# Patient Record
Sex: FEMALE | Race: WHITE | NOT HISPANIC OR LATINO | Employment: FULL TIME | URBAN - METROPOLITAN AREA
[De-identification: names, ages, dates, MRNs, and addresses within clinical notes are randomized per-mention and may not be internally consistent; named-entity substitution may affect disease eponyms.]

---

## 2024-03-26 ENCOUNTER — OFFICE VISIT (OUTPATIENT)
Dept: FAMILY MEDICINE CLINIC | Facility: CLINIC | Age: 29
End: 2024-03-26
Payer: COMMERCIAL

## 2024-03-26 ENCOUNTER — LAB (OUTPATIENT)
Dept: LAB | Facility: HOSPITAL | Age: 29
End: 2024-03-26
Payer: COMMERCIAL

## 2024-03-26 VITALS
HEIGHT: 64 IN | HEART RATE: 77 BPM | DIASTOLIC BLOOD PRESSURE: 70 MMHG | BODY MASS INDEX: 32.1 KG/M2 | OXYGEN SATURATION: 98 % | SYSTOLIC BLOOD PRESSURE: 110 MMHG | TEMPERATURE: 98.7 F | WEIGHT: 188 LBS

## 2024-03-26 DIAGNOSIS — Z00.00 ENCOUNTER FOR MEDICAL EXAMINATION TO ESTABLISH CARE: ICD-10-CM

## 2024-03-26 DIAGNOSIS — K62.89 RECTAL PAIN: ICD-10-CM

## 2024-03-26 DIAGNOSIS — N94.6 DYSMENORRHEA: ICD-10-CM

## 2024-03-26 DIAGNOSIS — Z01.419 ROUTINE GYNECOLOGICAL EXAMINATION: ICD-10-CM

## 2024-03-26 DIAGNOSIS — J45.20 MILD INTERMITTENT ASTHMA WITHOUT COMPLICATION: ICD-10-CM

## 2024-03-26 DIAGNOSIS — K92.1 HEMATOCHEZIA: ICD-10-CM

## 2024-03-26 DIAGNOSIS — K64.9 BLEEDING HEMORRHOIDS: Primary | ICD-10-CM

## 2024-03-26 LAB
DEPRECATED RDW RBC AUTO: 38.7 FL (ref 37–54)
ERYTHROCYTE [DISTWIDTH] IN BLOOD BY AUTOMATED COUNT: 12.7 % (ref 12.3–15.4)
HCT VFR BLD AUTO: 42.7 % (ref 34–46.6)
HGB BLD-MCNC: 14.1 G/DL (ref 12–15.9)
IRON 24H UR-MRATE: 114 MCG/DL (ref 37–145)
IRON SATN MFR SERPL: 29 % (ref 20–50)
MCH RBC QN AUTO: 28.1 PG (ref 26.6–33)
MCHC RBC AUTO-ENTMCNC: 33 G/DL (ref 31.5–35.7)
MCV RBC AUTO: 85.2 FL (ref 79–97)
PLATELET # BLD AUTO: 307 10*3/MM3 (ref 140–450)
PMV BLD AUTO: 8.8 FL (ref 6–12)
RBC # BLD AUTO: 5.01 10*6/MM3 (ref 3.77–5.28)
TIBC SERPL-MCNC: 387 MCG/DL (ref 298–536)
TRANSFERRIN SERPL-MCNC: 260 MG/DL (ref 200–360)
WBC NRBC COR # BLD AUTO: 8.01 10*3/MM3 (ref 3.4–10.8)

## 2024-03-26 PROCEDURE — 84466 ASSAY OF TRANSFERRIN: CPT | Performed by: STUDENT IN AN ORGANIZED HEALTH CARE EDUCATION/TRAINING PROGRAM

## 2024-03-26 PROCEDURE — 83540 ASSAY OF IRON: CPT | Performed by: STUDENT IN AN ORGANIZED HEALTH CARE EDUCATION/TRAINING PROGRAM

## 2024-03-26 PROCEDURE — 85027 COMPLETE CBC AUTOMATED: CPT | Performed by: STUDENT IN AN ORGANIZED HEALTH CARE EDUCATION/TRAINING PROGRAM

## 2024-03-26 RX ORDER — ALBUTEROL SULFATE 90 UG/1
2 AEROSOL, METERED RESPIRATORY (INHALATION) EVERY 6 HOURS PRN
Qty: 18 G | Refills: 2 | Status: SHIPPED | OUTPATIENT
Start: 2024-03-26

## 2024-03-26 RX ORDER — HYDROCORTISONE ACETATE 25 MG/1
SUPPOSITORY RECTAL
COMMUNITY
Start: 2024-03-22

## 2024-03-26 RX ORDER — ALBUTEROL SULFATE 90 UG/1
2 AEROSOL, METERED RESPIRATORY (INHALATION) EVERY 6 HOURS PRN
COMMUNITY
End: 2024-03-26 | Stop reason: SDUPTHER

## 2024-03-26 RX ORDER — HYDROCORTISONE 25 MG/G
CREAM TOPICAL
COMMUNITY
Start: 2024-03-22

## 2024-03-26 RX ORDER — NAPROXEN 500 MG/1
TABLET ORAL
COMMUNITY
Start: 2023-12-19

## 2024-03-26 NOTE — PROGRESS NOTES
New Patient Office Visit      Patient Name: ELIDIA Sanderson  : 1995   MRN: [id number]   Care Team: Patient Care Team:  Indigo Mitchell DO as PCP - General (Internal Medicine)    Chief Complaint:    Chief Complaint   Patient presents with    GI Problem     Rectal pain    Establish Care       History of Present Illness: ELIDIA Sanderson is a 28 y.o. female who is here today to establish care. She is the  for [facility] service organization.      Previous gynecologist has concerns for endometriosis, she has not had diagnostic biopsy. She struggles with chronic pelvic pain coming and going, typically prior to menstrual cycles. Can be debilitating at times and causes her to miss work. She has previously tried OCPs and did not feel this helped symptoms. She also felt this negatively impacted her mental health.     Asthma - intermittently uses albuterol and this controls symptoms well. Needs refill. Uses prior to exercise.     Has previously followed with GI Dr. Basilio Velez in [place] for BRPBR and rectal pain about 4 years ago. She had colonoscopy . She was diagnosed with anal fissure and external hemorrhoids. This improved temporarily but over the past 6 months she has noticed significant worsening and persistent symptoms. Has been straining and feeling constipated. She has been using preperation H suppositories, hydrocortisone 25mg suppositories, 2.5% hydrocortisone cream, naproxen, and lidocaine/phenylephrine cream.   She notices blood on her underwear, toilet paper and toilet bowl. Significant rectal pain. She feels clammy, sweaty, hot flashes during her bowel movements.   She went to [place] [facility] on 3/22 after struggling to have a bowel movement, feeling light headed, and experiencing significant rectal bleeding.   She drinks about 40oz water per day. She focuses on fiber rich diet. She takes OTC laxitive to help soften stool.     Subjective      Review of Systems:   Review of  Systems - See HPI    Past Medical History: History reviewed. No pertinent past medical history.    Past Surgical History: History reviewed. No pertinent surgical history.    Family History:   Family History   Problem Relation Age of Onset    Hypertension Mother     Diabetes Mother     Hypertension Father     Liver disease Father     Hypertension Sister     Cancer Maternal Grandmother     Cancer Maternal Grandfather     Cancer Paternal Grandmother     Cancer Paternal Grandfather        Social History:   Social History     Socioeconomic History    Marital status: Single   Tobacco Use    Smoking status: Never    Smokeless tobacco: Never   Vaping Use    Vaping status: Every Day    Substances: Nicotine, Flavoring    Devices: Disposable   Substance and Sexual Activity    Alcohol use: Yes     Alcohol/week: 1.0 standard drink of alcohol     Types: 1 Glasses of wine per week    Drug use: Yes     Frequency: 1.0 times per week     Types: Marijuana    Sexual activity: Yes     Partners: Male       Tobacco History:   Social History     Tobacco Use   Smoking Status Never   Smokeless Tobacco Never       Medications:     Current Outpatient Medications:     albuterol sulfate  (90 Base) MCG/ACT inhaler, Inhale 2 puffs Every 6 (Six) Hours As Needed for Shortness of Air or Wheezing., Disp: 18 g, Rfl: 2    hydrocortisone (ANUSOL-HC) 25 MG suppository, INSERT 1 SUPPOSITORY RECTALLY EVERY 12 HOURS AS NEEDED, Disp: , Rfl:     Hydrocortisone, Perianal, (ANUSOL-HC) 2.5 % rectal cream, APPLY 1 APPLICATION RECTAL ROUTE 2 TO 3 TIMES PER DAY AS NEEDED FOR HEMORRHOIDS, Disp: , Rfl:     naproxen (NAPROSYN) 500 MG tablet, Please see attached for detailed directions, Disp: , Rfl:     Allergies:   Allergies   Allergen Reactions    Cinnamon Rash    Citrus Rash       Objective     Physical Exam:  Vital Signs:   Vitals:    03/26/24 0945   BP: 110/70   Pulse: 77   Temp: 98.7 °F (37.1 °C)   TempSrc: Temporal   SpO2: 98%   Weight: 85.3 kg (188 lb)  "  Height: 162.6 cm (64\")   PainSc: 10-Worst pain ever   PainLoc: Rectum     Body mass index is 32.27 kg/m².     Physical Exam  Vitals reviewed.   Constitutional:       Appearance: Normal appearance. She is not ill-appearing.   Cardiovascular:      Rate and Rhythm: Normal rate and regular rhythm.      Heart sounds: Normal heart sounds.   Pulmonary:      Effort: Pulmonary effort is normal. No respiratory distress.      Breath sounds: Normal breath sounds. No wheezing.   Skin:     General: Skin is warm and dry.   Neurological:      Mental Status: She is alert.   Psychiatric:         Mood and Affect: Mood normal.         Behavior: Behavior normal.         Judgment: Judgment normal.         Assessment / Plan      Assessment/Plan:   Problems Addressed This Visit  Diagnoses and all orders for this visit:    1. Bleeding hemorrhoids (Primary)  -     CBC No Differential; Future  -     Ambulatory Referral to Colorectal Surgery  -     Iron Profile; Future    Continue Hydrocortisone cream and suppositories provided in ER - symptoms improving  Discussed importance of fiber rich diet, miralax to help soften stool, hydration, avoid sedentary lifestyle as possible. Recommend waffle cushion or donut pillow to alleviate rectal pressure.   Referral to colorectal surgery for surgical consultation and consideration of repeat colonoscopy (last was 4 years ago performed [place])  Evaluate for anemia    2. Hematochezia  -     CBC No Differential; Future  -     Ambulatory Referral to Colorectal Surgery  -     Iron Profile; Future    3. Rectal pain  -     Ambulatory Referral to Colorectal Surgery  -     Iron Profile; Future    4. Encounter for medical examination to establish care  Discussed importance of preventative care including vaccinations, age appropriate cancer screening, routine lab work, healthy diet, and active lifestyle.  Pap 2/2023 - normal following with GYN    5. Routine gynecological examination  -  "    Ambulatory Referral to Gynecology    6. Dysmenorrhea  -     Ambulatory Referral to Gynecology    7. Mild intermittent asthma without complication  -     albuterol sulfate  (90 Base) MCG/ACT inhaler; Inhale 2 puffs Every 6 (Six) Hours As Needed for Shortness of Air or Wheezing.  Dispense: 18 g; Refill: 2      Plan of care reviewed with patient at the conclusion of today's visit. Education was provided regarding diagnosis and management.  Patient verbalizes understanding of and agreement with management plan.      Follow Up: 6 months follow up or sooner if needed   No follow-ups on file.          DO RONALD Alcaraz PC DAV[address]AVEZ  [facility]  [address]  [facility] [address]  Fax [phone]  Phone [phone]

## 2024-03-28 ENCOUNTER — PATIENT ROUNDING (BHMG ONLY) (OUTPATIENT)
Dept: FAMILY MEDICINE CLINIC | Facility: CLINIC | Age: 29
End: 2024-03-28
Payer: COMMERCIAL

## 2024-03-28 NOTE — PROGRESS NOTES
Sent patient rounding message via [employer]   [Fever] : no fever [Chills] : no chills [Dry Eyes] : no dry eyes [Cough] : cough [Sputum] : no sputum [Dyspnea] : no dyspnea [SOB on Exertion] : no sob on exertion [Chest Discomfort] : no chest discomfort [Orthopnea] : no orthopnea [Nasal Discharge] : no nasal discharge [TextBox_148] : rest of ROS negative except in HPI

## 2024-06-25 ENCOUNTER — OFFICE VISIT (OUTPATIENT)
Dept: FAMILY MEDICINE CLINIC | Facility: CLINIC | Age: 29
End: 2024-06-25
Payer: COMMERCIAL

## 2024-06-25 VITALS
OXYGEN SATURATION: 94 % | BODY MASS INDEX: 31.92 KG/M2 | DIASTOLIC BLOOD PRESSURE: 68 MMHG | WEIGHT: 187 LBS | HEART RATE: 64 BPM | RESPIRATION RATE: 18 BRPM | HEIGHT: 64 IN | SYSTOLIC BLOOD PRESSURE: 118 MMHG

## 2024-06-25 DIAGNOSIS — K59.09 CHRONIC CONSTIPATION: ICD-10-CM

## 2024-06-25 DIAGNOSIS — K64.4 ANAL SKIN TAG: ICD-10-CM

## 2024-06-25 DIAGNOSIS — K62.5 RECTAL BLEEDING: Primary | ICD-10-CM

## 2024-06-25 DIAGNOSIS — K60.2 ANAL FISSURE: ICD-10-CM

## 2024-06-25 DIAGNOSIS — K64.4 EXTERNAL HEMORRHOIDS: ICD-10-CM

## 2024-06-25 DIAGNOSIS — F17.200 VAPING NICOTINE DEPENDENCE, NON-TOBACCO PRODUCT: ICD-10-CM

## 2024-06-25 PROCEDURE — 99214 OFFICE O/P EST MOD 30 MIN: CPT | Performed by: STUDENT IN AN ORGANIZED HEALTH CARE EDUCATION/TRAINING PROGRAM

## 2024-06-25 RX ORDER — POLYETHYLENE GLYCOL 3350 17 G/17G
17 POWDER, FOR SOLUTION ORAL DAILY
COMMUNITY
Start: 2024-04-02

## 2024-06-25 RX ORDER — HYDROCORTISONE ACETATE 25 MG/1
25 SUPPOSITORY RECTAL 2 TIMES DAILY PRN
Qty: 24 EACH | Refills: 0 | Status: SHIPPED | OUTPATIENT
Start: 2024-06-25

## 2024-06-25 NOTE — PROGRESS NOTES
Established Office Visit      Patient Name: Tonya Sanderson  : 1995   MRN: [id number]   Care Team: Patient Care Team:  Indigo Mitchell DO as PCP - General (Internal Medicine)    Chief Complaint:    Chief Complaint   Patient presents with    Hemorrhoids     Follow up    Nicotine Dependence     Pt is interested in smoking Cessation        History of Present Illness: Tonya Sanderson is a 29 y.o. female who is here today to follow up with rectal bleeding.    As detailed last visit 3/2024 - she has struggled with rectal bleeding for several years, colonoscopy 202 attributed bleeding to anal fissure and external hemorrhoids, exacerbated by her constipation.   Continued to have symptoms of rectal bleeding, pain despite using preperation H suppositories, hydrocortisone 25mg suppositories, 2.5% hydrocortisone cream, naproxen, and lidocaine/phenylephrine cream. She drinks about 40oz water per day. She focuses on fiber rich diet. She takes OTC laxitive to help soften stool. She was referred to colorectal surgery for consultation last visit.     She has since established with [facility] and had colonoscopy 2024. Reports she was diagnosed with anal fissure and anal skin tag. Continues to struggle with rectal pain and bleeding on a daily basis. Both in toilet bowl and on her clothing. Continues to experience constipation despite efforts at dietary changes, OTC stool softeners, miralax, fiber supplement. Reports provider at [facility] was hesitant to move forward with surgery due to recovery time.      Vaping - reports every day use of nicotine vape, ongoing for about 6 years and interested in quitting. She has contemplated quitting for quite some time now but has not ever taken the leap.   She has hx of asthma and has noticed worsening coughing with vaping.   No cigarette use     Subjective      Review of Systems:   Review of Systems - See HPI    I have reviewed and the following portions of the patient's history were updated  "as appropriate: past family history, past medical history, past social history, past surgical history and problem list.    Medications:     Current Outpatient Medications:     albuterol sulfate  (90 Base) MCG/ACT inhaler, Inhale 2 puffs Every 6 (Six) Hours As Needed for Shortness of Air or Wheezing., Disp: 18 g, Rfl: 2    hydrocortisone (ANUSOL-HC) 25 MG suppository, Insert 1 suppository into the rectum 2 (Two) Times a Day As Needed for Hemorrhoids., Disp: 24 each, Rfl: 0    Hydrocortisone, Perianal, (ANUSOL-HC) 2.5 % rectal cream, APPLY 1 APPLICATION RECTAL ROUTE 2 TO 3 TIMES PER DAY AS NEEDED FOR HEMORRHOIDS, Disp: , Rfl:     naproxen (NAPROSYN) 500 MG tablet, Please see attached for detailed directions, Disp: , Rfl:     polyethylene glycol (MIRALAX) 17 GM/SCOOP powder, Take 17 g by mouth Daily., Disp: , Rfl:     linaclotide (Linzess) 145 MCG capsule capsule, Take 1 capsule by mouth Every Morning Before Breakfast., Disp: 30 capsule, Rfl: 2    Allergies:   Allergies   Allergen Reactions    Cinnamon Rash    Citrus Rash       Objective     Physical Exam:  Vital Signs:   Vitals:    06/25/24 1157   BP: 118/68   BP Location: Left arm   Patient Position: Sitting   Cuff Size: Adult   Pulse: 64   Resp: 18   SpO2: 94%   Weight: 84.8 kg (187 lb)   Height: 162.6 cm (64\")     Body mass index is 32.1 kg/m².     Assessment / Plan      Assessment/Plan:   Problems Addressed This Visit  Diagnoses and all orders for this visit:    1. Rectal bleeding (Primary)  -     hydrocortisone (ANUSOL-HC) 25 MG suppository; Insert 1 suppository into the rectum 2 (Two) Times a Day As Needed for Hemorrhoids.  Dispense: 24 each; Refill: 0  -     Ambulatory Referral to Colorectal Surgery    Continue to work towards high fiber diet, prune supplement, hydration, taking miralax daily. Add Linzess to alleviate chronic constipation. Interested in transitioning care from [facility] to alternative colorectal group. I have put in a referral to  " Colorectal. She would like to inquire about surgical options as this has been an ongoing problem for years, interfering with her quality of life and is hoping for resolution.  [facility] records requested.    2. Chronic constipation  -     hydrocortisone (ANUSOL-HC) 25 MG suppository; Insert 1 suppository into the rectum 2 (Two) Times a Day As Needed for Hemorrhoids.  Dispense: 24 each; Refill: 0  -     linaclotide (Linzess) 145 MCG capsule capsule; Take 1 capsule by mouth Every Morning Before Breakfast.  Dispense: 30 capsule; Refill: 2    3. Anal fissure  -     hydrocortisone (ANUSOL-HC) 25 MG suppository; Insert 1 suppository into the rectum 2 (Two) Times a Day As Needed for Hemorrhoids.  Dispense: 24 each; Refill: 0  -     Ambulatory Referral to Colorectal Surgery    4. Anal skin tag  -     hydrocortisone (ANUSOL-HC) 25 MG suppository; Insert 1 suppository into the rectum 2 (Two) Times a Day As Needed for Hemorrhoids.  Dispense: 24 each; Refill: 0  -     Ambulatory Referral to Colorectal Surgery    5. External hemorrhoids  -     Ambulatory Referral to Colorectal Surgery    6. Vaping nicotine dependence, non-tobacco product  Encouraged smoking cessation and counseled on adverse health risks associated with continued smoking. Discussed options for assistance including NRT, medications, and cessation therapy. Patient is agreeable to quitting smoking. She will plan on setting a date and working towards completed cessation. Not interested in NRT or further assistance at this time.       Plan of care reviewed with patient at the conclusion of today's visit. Education was provided regarding diagnosis and management.  Patient verbalizes understanding of and agreement with management plan.    Follow Up: 3 months, telehealth   No follow-ups on file.        DO RONALD Alcaraz RD  [facility]  [address]  [facility] [address]  Fax [phone]  Phone  [phone]

## 2024-09-04 ENCOUNTER — LAB (OUTPATIENT)
Dept: LAB | Facility: HOSPITAL | Age: 29
End: 2024-09-04
Payer: COMMERCIAL

## 2024-09-04 ENCOUNTER — OFFICE VISIT (OUTPATIENT)
Dept: FAMILY MEDICINE CLINIC | Facility: CLINIC | Age: 29
End: 2024-09-04
Payer: COMMERCIAL

## 2024-09-04 VITALS
OXYGEN SATURATION: 95 % | SYSTOLIC BLOOD PRESSURE: 116 MMHG | HEIGHT: 64 IN | HEART RATE: 74 BPM | DIASTOLIC BLOOD PRESSURE: 60 MMHG | BODY MASS INDEX: 33.46 KG/M2 | WEIGHT: 196 LBS

## 2024-09-04 DIAGNOSIS — L68.0 HIRSUTISM: ICD-10-CM

## 2024-09-04 DIAGNOSIS — F31.70 BIPOLAR DISORDER IN PARTIAL REMISSION, MOST RECENT EPISODE UNSPECIFIED TYPE: ICD-10-CM

## 2024-09-04 DIAGNOSIS — K62.5 RECTAL BLEEDING: ICD-10-CM

## 2024-09-04 DIAGNOSIS — Z11.59 ENCOUNTER FOR HCV SCREENING TEST FOR LOW RISK PATIENT: ICD-10-CM

## 2024-09-04 DIAGNOSIS — Z00.00 ANNUAL PHYSICAL EXAM: Primary | ICD-10-CM

## 2024-09-04 DIAGNOSIS — Z13.228 SCREENING FOR METABOLIC DISORDER: ICD-10-CM

## 2024-09-04 DIAGNOSIS — K60.2 ANAL FISSURE: ICD-10-CM

## 2024-09-04 DIAGNOSIS — K64.4 ANAL SKIN TAG: ICD-10-CM

## 2024-09-04 DIAGNOSIS — Z13.220 SCREENING FOR CHOLESTEROL LEVEL: ICD-10-CM

## 2024-09-04 DIAGNOSIS — Z01.419 ROUTINE GYNECOLOGICAL EXAMINATION: ICD-10-CM

## 2024-09-04 DIAGNOSIS — K59.09 CHRONIC CONSTIPATION: ICD-10-CM

## 2024-09-04 DIAGNOSIS — Z00.00 ANNUAL PHYSICAL EXAM: ICD-10-CM

## 2024-09-04 DIAGNOSIS — F41.9 ANXIETY: ICD-10-CM

## 2024-09-04 DIAGNOSIS — E66.9 CLASS 1 OBESITY WITHOUT SERIOUS COMORBIDITY WITH BODY MASS INDEX (BMI) OF 33.0 TO 33.9 IN ADULT, UNSPECIFIED OBESITY TYPE: ICD-10-CM

## 2024-09-04 DIAGNOSIS — F17.200 VAPING NICOTINE DEPENDENCE, NON-TOBACCO PRODUCT: ICD-10-CM

## 2024-09-04 DIAGNOSIS — R41.840 INATTENTION: ICD-10-CM

## 2024-09-04 LAB
ALBUMIN SERPL-MCNC: 4.4 G/DL (ref 3.5–5.2)
ALBUMIN/GLOB SERPL: 1.7 G/DL
ALP SERPL-CCNC: 98 U/L (ref 39–117)
ALT SERPL W P-5'-P-CCNC: 14 U/L (ref 1–33)
ANION GAP SERPL CALCULATED.3IONS-SCNC: 8 MMOL/L (ref 5–15)
AST SERPL-CCNC: 20 U/L (ref 1–32)
BILIRUB SERPL-MCNC: 0.2 MG/DL (ref 0–1.2)
BUN SERPL-MCNC: 8 MG/DL (ref 6–20)
BUN/CREAT SERPL: 10.7 (ref 7–25)
CALCIUM SPEC-SCNC: 9.1 MG/DL (ref 8.6–10.5)
CHLORIDE SERPL-SCNC: 105 MMOL/L (ref 98–107)
CHOLEST SERPL-MCNC: 143 MG/DL (ref 0–200)
CO2 SERPL-SCNC: 23 MMOL/L (ref 22–29)
CREAT SERPL-MCNC: 0.75 MG/DL (ref 0.57–1)
DEPRECATED RDW RBC AUTO: 40.2 FL (ref 37–54)
EGFRCR SERPLBLD CKD-EPI 2021: 110.7 ML/MIN/1.73
ERYTHROCYTE [DISTWIDTH] IN BLOOD BY AUTOMATED COUNT: 12.5 % (ref 12.3–15.4)
GLOBULIN UR ELPH-MCNC: 2.6 GM/DL
GLUCOSE SERPL-MCNC: 92 MG/DL (ref 65–99)
HBA1C MFR BLD: 5 % (ref 4.8–5.6)
HCT VFR BLD AUTO: 41.2 % (ref 34–46.6)
HCV AB SER QL: NORMAL
HDLC SERPL-MCNC: 42 MG/DL (ref 40–60)
HGB BLD-MCNC: 13.8 G/DL (ref 12–15.9)
LDLC SERPL CALC-MCNC: 88 MG/DL (ref 0–100)
LDLC/HDLC SERPL: 2.1 {RATIO}
MCH RBC QN AUTO: 29.4 PG (ref 26.6–33)
MCHC RBC AUTO-ENTMCNC: 33.5 G/DL (ref 31.5–35.7)
MCV RBC AUTO: 87.7 FL (ref 79–97)
PLATELET # BLD AUTO: 329 10*3/MM3 (ref 140–450)
PMV BLD AUTO: 9 FL (ref 6–12)
POTASSIUM SERPL-SCNC: 4.3 MMOL/L (ref 3.5–5.2)
PROT SERPL-MCNC: 7 G/DL (ref 6–8.5)
RBC # BLD AUTO: 4.7 10*6/MM3 (ref 3.77–5.28)
SODIUM SERPL-SCNC: 136 MMOL/L (ref 136–145)
TRIGL SERPL-MCNC: 64 MG/DL (ref 0–150)
TSH SERPL DL<=0.05 MIU/L-ACNC: 1.45 UIU/ML (ref 0.27–4.2)
VLDLC SERPL-MCNC: 13 MG/DL (ref 5–40)
WBC NRBC COR # BLD AUTO: 8.06 10*3/MM3 (ref 3.4–10.8)

## 2024-09-04 PROCEDURE — 84403 ASSAY OF TOTAL TESTOSTERONE: CPT

## 2024-09-04 PROCEDURE — 80053 COMPREHEN METABOLIC PANEL: CPT

## 2024-09-04 PROCEDURE — 83036 HEMOGLOBIN GLYCOSYLATED A1C: CPT

## 2024-09-04 PROCEDURE — 84443 ASSAY THYROID STIM HORMONE: CPT

## 2024-09-04 PROCEDURE — 86803 HEPATITIS C AB TEST: CPT

## 2024-09-04 PROCEDURE — 85027 COMPLETE CBC AUTOMATED: CPT

## 2024-09-04 PROCEDURE — 99395 PREV VISIT EST AGE 18-39: CPT | Performed by: STUDENT IN AN ORGANIZED HEALTH CARE EDUCATION/TRAINING PROGRAM

## 2024-09-04 PROCEDURE — 99214 OFFICE O/P EST MOD 30 MIN: CPT | Performed by: STUDENT IN AN ORGANIZED HEALTH CARE EDUCATION/TRAINING PROGRAM

## 2024-09-04 PROCEDURE — 84402 ASSAY OF FREE TESTOSTERONE: CPT

## 2024-09-04 PROCEDURE — 80061 LIPID PANEL: CPT

## 2024-09-04 PROCEDURE — 82627 DEHYDROEPIANDROSTERONE: CPT

## 2024-09-04 RX ORDER — HYDROCORTISONE ACETATE 25 MG/1
25 SUPPOSITORY RECTAL 2 TIMES DAILY PRN
Qty: 24 EACH | Refills: 0 | Status: SHIPPED | OUTPATIENT
Start: 2024-09-04

## 2024-09-04 NOTE — PROGRESS NOTES
Physical Exam     Patient Name: Tonya Sanderson  : 1995   MRN: [id number]   Care Team: Patient Care Team:  Indigo Mitchell DO as PCP - General (Internal Medicine)  Loi Amin MD as Consulting Physician (Colon and Rectal Surgery)    Chief Complaint:    Chief Complaint   Patient presents with    Annual Exam       History of Present Illness: Tonya Sanderson is a 29 y.o. female who is presents today for annual healthcare maintenance.       Health Maintenance   Topic Date Due    HEPATITIS C SCREENING  Never done    COVID-19 Vaccine (3 - - season) 2024    INFLUENZA VACCINE  2024    Pneumococcal Vaccine 0-64 (1 of 2 - PCV) 2025 (Originally 2001)    TDAP/TD VACCINES (1 - Tdap) 2025 (Originally 2014)    ANNUAL PHYSICAL  2025    BMI FOLLOWUP  2025    PAP SMEAR  02/15/2026       Subjective      Review of Systems:   Review of Systems - See HPI    Past Medical History:   Past Medical History:   Diagnosis Date    ADHD (attention deficit hyperactivity disorder)     Therapist noted in sessions earlier this year that I exhibit symptoms of OCD or ADHD    Anxiety 2017    Extensive- happy to chat in person.    Asthma     Depression 2017       Past Surgical History:   Past Surgical History:   Procedure Laterality Date    COLONOSCOPY         Family History:   Family History   Problem Relation Age of Onset    Hypertension Mother     Diabetes Mother     Anxiety disorder Mother     COPD Mother     Depression Mother     Mental illness Mother     Hypertension Father     Liver disease Father     Alcohol abuse Father     Hypertension Sister     Cancer Maternal Grandmother     Diabetes Maternal Grandmother     Cancer Maternal Grandfather     Cancer Paternal Grandmother     Cancer Paternal Grandfather        Social History:   Social History     Socioeconomic History    Marital status: Single   Tobacco Use    Smoking status: Former     Current packs/day: 0.00     Average  packs/day: 0.3 packs/day for 2.0 years (0.5 ttl pk-yrs)     Types: Cigarettes     Start date: 2015     Quit date: 2017     Years since quittin.6     Passive exposure: Past    Smokeless tobacco: Never    Tobacco comments:     Nicotine vape, smoked cigarettes around 3699-6719   Vaping Use    Vaping status: Every Day    Start date: 2017    Substances: Nicotine, Flavoring    Devices: Disposable, Pre-filled pod    Passive vaping exposure: Yes   Substance and Sexual Activity    Alcohol use: Yes     Alcohol/week: 1.0 standard drink of alcohol     Types: 1 Glasses of wine per week    Drug use: Yes     Frequency: 1.0 times per week     Types: Marijuana    Sexual activity: Not Currently     Partners: Male       Tobacco History:   Social History     Tobacco Use   Smoking Status Former    Current packs/day: 0.00    Average packs/day: 0.3 packs/day for 2.0 years (0.5 ttl pk-yrs)    Types: Cigarettes    Start date: 2015    Quit date: 2017    Years since quittin.6    Passive exposure: Past   Smokeless Tobacco Never   Tobacco Comments    Nicotine vape, smoked cigarettes around 2610-1555       Medications:     Current Outpatient Medications:     albuterol sulfate  (90 Base) MCG/ACT inhaler, Inhale 2 puffs Every 6 (Six) Hours As Needed for Shortness of Air or Wheezing., Disp: 18 g, Rfl: 2    hydrocortisone (ANUSOL-HC) 25 MG suppository, Insert 1 suppository into the rectum 2 (Two) Times a Day As Needed for Hemorrhoids., Disp: 24 each, Rfl: 0    Hydrocortisone, Perianal, (ANUSOL-HC) 2.5 % rectal cream, APPLY 1 APPLICATION RECTAL ROUTE 2 TO 3 TIMES PER DAY AS NEEDED FOR HEMORRHOIDS, Disp: , Rfl:     naproxen (NAPROSYN) 500 MG tablet, Please see attached for detailed directions, Disp: , Rfl:     polyethylene glycol (MIRALAX) 17 GM/SCOOP powder, Take 17 g by mouth Daily., Disp: , Rfl:     Allergies:   Allergies   Allergen Reactions    Cinnamon Rash    [place] Rash       Past Medical History, Social History,  "Family History and Care Team were all reviewed with patient and updated as appropriate.       Objective     Physical Exam:  Vital Signs:   Vitals:    09/04/24 0815   BP: 116/60   Pulse: 74   SpO2: 95%   Weight: 88.9 kg (196 lb)   Height: 162.6 cm (64\")     Body mass index is 33.64 kg/m².     Physical Exam  Vitals reviewed.   Constitutional:       Appearance: Normal appearance. She is not ill-appearing.   Cardiovascular:      Rate and Rhythm: Normal rate and regular rhythm.      Heart sounds: Normal heart sounds. No murmur heard.  Pulmonary:      Effort: Pulmonary effort is normal. No respiratory distress.      Breath sounds: Normal breath sounds. No wheezing.   Skin:     General: Skin is warm and dry.   Neurological:      Mental Status: She is alert.   Psychiatric:         Mood and Affect: Mood normal.         Behavior: Behavior normal.         Judgment: Judgment normal.         Assessment / Plan      Assessment/Plan:   Problems Addressed This Visit  Diagnoses and all orders for this visit:    1. Annual physical exam (Primary)  -     CBC (No Diff); Future  -     Lipid Panel; Future  -     Comprehensive Metabolic Panel; Future  -     TSH; Future    2. Screening for cholesterol level  -     Lipid Panel; Future    3. Screening for metabolic disorder  -     Comprehensive Metabolic Panel; Future  -     TSH; Future    4. Chronic constipation  -     Comprehensive Metabolic Panel; Future  -     hydrocortisone (ANUSOL-HC) 25 MG suppository; Insert 1 suppository into the rectum 2 (Two) Times a Day As Needed for Hemorrhoids.  Dispense: 24 each; Refill: 0    5. Rectal bleeding  -     CBC (No Diff); Future  -     hydrocortisone (ANUSOL-HC) 25 MG suppository; Insert 1 suppository into the rectum 2 (Two) Times a Day As Needed for Hemorrhoids.  Dispense: 24 each; Refill: 0    6. Vaping nicotine dependence, non-tobacco product    7. Routine gynecological examination  -     Ambulatory Referral to Gynecology    8. Anal fissure  -     " hydrocortisone (ANUSOL-HC) 25 MG suppository; Insert 1 suppository into the rectum 2 (Two) Times a Day As Needed for Hemorrhoids.  Dispense: 24 each; Refill: 0    9. Anal skin tag  -     hydrocortisone (ANUSOL-HC) 25 MG suppository; Insert 1 suppository into the rectum 2 (Two) Times a Day As Needed for Hemorrhoids.  Dispense: 24 each; Refill: 0    10. Encounter for HCV screening test for low risk patient  -     Hepatitis C antibody; Future    11. Hirsutism  -     Testosterone, Free, Total; Future  -     DHEA-Sulfate; Future  -     Hemoglobin A1c; Future    12. Class 1 obesity without serious comorbidity with body mass index (BMI) of 33.0 to 33.9 in adult, unspecified obesity type  -     Ambulatory Referral to Nutrition Services    13. Inattention  -     Ambulatory Referral to Behavioral Health    14. Anxiety  -     Ambulatory Referral to Behavioral Health    15. Bipolar disorder in partial remission, most recent episode unspecified type  -     Ambulatory Referral to Behavioral Health        Healthcare Maintenance   Vaccines:  -COVID19 and influenza encouraged at pharmacy     Cancer Screening  -Pap smear 2/2023 normal cytology - repeat 2026    Other  -Counseled on importance of maintaining healthy diet and routine exercise. Encouraged 150 min exercise per week.  -Vaping cessation: Encouraged vaping cessation and counseled on adverse health risks associated with continued vaping. Discussed options for assistance including NRT, medications, and cessation therapy. Patient is agreeable to quitting smoking. Goal stop date 1/1/2025  -Sexual Health: does not wish for contraception or STD screening.   -Blood pressure is at goal <130/80  -Metabolic screening today    Encouraged routine eye and dental exams.     She has noticed coarse hair on her chin, nipples, stomach over the past several months. Reports normal menstrual cycles monthly but does admit to severe dysmenorrhea.   Her previous GYN had concerns for endometriosis.  She would like to transition care to [facility]. She is planning to discuss this with her provider. Will obtain testosterone, DHEA today. Referred to GYN.     She would like to work on weight loss. Struggles with night time snacking, eating out often for lunch. She enjoys rock climbing and walking for exercise. She is interested in nutrition referral. Discussed this along with setting realistic goals towards dietary changes, portion control.     She has concerns for inattentiveness, OCD tendencies. Follows with talk therapist. She is interested in referral to behavioral health for med management. She has long history of anxiety.     Plan of care reviewed with patient at the conclusion of today's visit. Education was provided regarding diagnosis and management.  Patient verbalizes understanding of and agreement with management plan.    Follow Up:   Return in about 4 weeks (around 10/2/2024) for Video visit. For weight, mood, and vaping follow up         DO RONALD Alcaraz RD  [facility]  [address]  [facility] [address]  Fax [phone]  Phone [phone]

## 2024-09-05 LAB — DHEA-S SERPL-MCNC: 263 UG/DL (ref 84.8–378)

## 2024-09-08 LAB
TESTOST FREE SERPL-MCNC: 0.9 PG/ML (ref 0–4.2)
TESTOST SERPL-MCNC: 34 NG/DL (ref 13–71)

## 2024-10-01 ENCOUNTER — TELEMEDICINE (OUTPATIENT)
Dept: FAMILY MEDICINE CLINIC | Facility: CLINIC | Age: 29
End: 2024-10-01
Payer: COMMERCIAL

## 2024-10-01 VITALS — BODY MASS INDEX: 33.64 KG/M2 | HEIGHT: 64 IN

## 2024-10-01 DIAGNOSIS — E66.811 CLASS 1 OBESITY WITHOUT SERIOUS COMORBIDITY WITH BODY MASS INDEX (BMI) OF 33.0 TO 33.9 IN ADULT, UNSPECIFIED OBESITY TYPE: Primary | ICD-10-CM

## 2024-10-01 DIAGNOSIS — K60.2 ANAL FISSURE: ICD-10-CM

## 2024-10-01 DIAGNOSIS — K59.09 CHRONIC CONSTIPATION: ICD-10-CM

## 2024-10-01 DIAGNOSIS — K64.4 ANAL SKIN TAG: ICD-10-CM

## 2024-10-01 DIAGNOSIS — F31.70 BIPOLAR DISORDER IN PARTIAL REMISSION, MOST RECENT EPISODE UNSPECIFIED TYPE: ICD-10-CM

## 2024-10-01 DIAGNOSIS — F41.9 ANXIETY: ICD-10-CM

## 2024-10-01 DIAGNOSIS — K62.5 RECTAL BLEEDING: ICD-10-CM

## 2024-10-01 PROCEDURE — 99214 OFFICE O/P EST MOD 30 MIN: CPT | Performed by: STUDENT IN AN ORGANIZED HEALTH CARE EDUCATION/TRAINING PROGRAM

## 2024-10-01 RX ORDER — HYDROCORTISONE ACETATE 25 MG/1
25 SUPPOSITORY RECTAL 2 TIMES DAILY PRN
Qty: 24 EACH | Refills: 0 | Status: SHIPPED | OUTPATIENT
Start: 2024-10-01

## 2024-10-01 NOTE — PROGRESS NOTES
Virtual Video Visit      Date: 10/01/2024   Patient Name: Tonya Sanderson  : 1995   MRN: [id number]     Chief Complaint:    Chief Complaint   Patient presents with    Dysmenorrhea    Hemorrhoids       I have reviewed the E-Visit questionnaire and the patient's answers, my assessment and plan are listed below.     This provider is located at the home address on file with [facility]. using a secure [employer] Video Visit through [employer]. Patient is being seen remotely via telehealth at their home address in [place], and stated they are in a secure environment for this session. The patient's condition being diagnosed/treated is appropriate for telemedicine. The provider identified herself as well as her credentials. The patient, and/or patients guardian, consent to be seen remotely, and when consent is given they understand that the consent allows for patient identifiable information to be sent to a third party as needed. They may refuse to be seen remotely at any time. The electronic data is encrypted and password protected, and the patient and/or guardian has been advised of the potential risks to privacy not withstanding such measures.    You have chosen to receive care through a virtual video visit. Do you consent to use a video/audio connection for your medical care today? Yes    History of Present Illness: Tonya Sanderson is a 29 y.o. female with obesity, dysmenorrhea, anxiety, bipolar disorder who is seen today to follow up with mood, weight.    She has been working on mindful eating habits. Does not have scale at home. Ultimate goals for weight loss include overall energy improvement, endurance, feeling comfortable in her own skin rather than number on scale.   She enjoys climbing for exercising and would like to incorporate more activity in her day to day.   She feels better mentally and physically better when she is exercising regularly. Plans to start walking her dog 3 days per week.  "  She was referred to behavioral health last visit and has appointment to establish with med management at the end of this month. Looking forward to this. Feels talk therapy has been helpful. Mood has been in a better place.     Anal fissure and constipation has improved somewhat with dietary modifications. She does need refills of suppository on as needed basis but flares have decreased to about once weekly rather than several times per week. She is still plannning on following up with [facility] regarding long term management. Not compliant with miralax regularly    Considering going to a med spa for GLP1 agonist medication to aide with weight loss.         Subjective      Review of Systems:   Review of Systems    I have reviewed and the following portions of the patient's history were updated as appropriate: past family history, past medical history, past social history, past surgical history and problem list.    Medications:     Current Outpatient Medications:     albuterol sulfate  (90 Base) MCG/ACT inhaler, Inhale 2 puffs Every 6 (Six) Hours As Needed for Shortness of Air or Wheezing., Disp: 18 g, Rfl: 2    hydrocortisone (ANUSOL-HC) 25 MG suppository, Insert 1 suppository into the rectum 2 (Two) Times a Day As Needed for Hemorrhoids., Disp: 24 each, Rfl: 0    Hydrocortisone, Perianal, (ANUSOL-HC) 2.5 % rectal cream, APPLY 1 APPLICATION RECTAL ROUTE 2 TO 3 TIMES PER DAY AS NEEDED FOR HEMORRHOIDS, Disp: , Rfl:     naproxen (NAPROSYN) 500 MG tablet, Please see attached for detailed directions, Disp: , Rfl:     polyethylene glycol (MIRALAX) 17 GM/SCOOP powder, Take 17 g by mouth Daily., Disp: , Rfl:     Allergies:   Allergies   Allergen Reactions    Cinnamon Rash    Citrus Rash       Objective     Physical Exam:  Vital Signs:   Vitals:    10/01/24 0805   Height: 162.6 cm (64\")     Body mass index is 33.64 kg/m².    Physical Exam      Assessment / Plan      Assessment/Plan:   Diagnoses and all orders for this " visit:    1. Class 1 obesity without serious comorbidity with body mass index (BMI) of 33.0 to 33.9 in adult, unspecified obesity type (Primary)  Counseled patient on importance of weight loss and maintaining healthy lifestyle. Recommended calorie deficit and 150 minutes of exercise per week.  Discussed setting realistic goals over the next several weeks to gradually work up endurance and build habit of exercise into schedule.  Discussed GLP1 agonist - would be hesitant in her case give chronic constipation with anal fissure. I worry this may make this problem worse and she understands. Would advocate for regulating bowel regimen prior to entertaining idea of GLP1 agonist.     2. Chronic constipation  -     hydrocortisone (ANUSOL-HC) 25 MG suppository; Insert 1 suppository into the rectum 2 (Two) Times a Day As Needed for Hemorrhoids.  Dispense: 24 each; Refill: 0  Improving with dietary modifications  Recommend compliance with miralax and hydration. Active lifestyle encouraged     3. Rectal bleeding  -     hydrocortisone (ANUSOL-HC) 25 MG suppository; Insert 1 suppository into the rectum 2 (Two) Times a Day As Needed for Hemorrhoids.  Dispense: 24 each; Refill: 0    4. Anal fissure  -     hydrocortisone (ANUSOL-HC) 25 MG suppository; Insert 1 suppository into the rectum 2 (Two) Times a Day As Needed for Hemorrhoids.  Dispense: 24 each; Refill: 0    5. Anal skin tag  -     hydrocortisone (ANUSOL-HC) 25 MG suppository; Insert 1 suppository into the rectum 2 (Two) Times a Day As Needed for Hemorrhoids.  Dispense: 24 each; Refill: 0    6. Anxiety  7. Bipolar disorder in partial remission, most recent episode unspecified type  Stable - has appointment to establish with  for med management at end of month. Continue talk therapy.        Follow Up:   Return in about 8 weeks (around 11/26/2024) for Video visit to recheck mental health and weight .    Any medications prescribed have been sent electronically to patient's  preferred pharmacy     Indigo Mitchell DO  [facility]  10/01/24  08:03 EDT

## 2024-10-30 ENCOUNTER — OFFICE VISIT (OUTPATIENT)
Age: 29
End: 2024-10-30
Payer: COMMERCIAL

## 2024-10-30 VITALS
BODY MASS INDEX: 33.8 KG/M2 | HEART RATE: 84 BPM | SYSTOLIC BLOOD PRESSURE: 102 MMHG | DIASTOLIC BLOOD PRESSURE: 74 MMHG | OXYGEN SATURATION: 98 % | WEIGHT: 198 LBS | HEIGHT: 64 IN

## 2024-10-30 DIAGNOSIS — Z51.81 THERAPEUTIC DRUG MONITORING: Primary | ICD-10-CM

## 2024-10-30 DIAGNOSIS — F41.1 GENERALIZED ANXIETY DISORDER: ICD-10-CM

## 2024-10-30 DIAGNOSIS — F31.62 BIPOLAR DISORDER, CURRENT EPISODE MIXED, MODERATE: ICD-10-CM

## 2024-10-30 NOTE — PROGRESS NOTES
New Patient Office Visit      Date: 10/30/2024  Patient Name: Tonya Sanderson  : 1995   MRN: [id number]     Referring Provider: Indigo Mitchell DO    Chief Complaint:      ICD-10-CM ICD-9-CM   1. Therapeutic drug monitoring  Z51.81 V58.83   2. Bipolar disorder, current episode mixed, moderate  F31.62 296.62   3. Generalized anxiety disorder  F41.1 300.02        History of Present Illness:   Tonya Sanderson is a 29 y.o. female who is here today for increased symptoms of anxiety and depression and mood stabilization. This is the patient's initial encounter with this provider. Patient reports history of diagnoses including anxiety and depression and possible Bipolar. Patient reports trialing medications for mental health in the past including citalopram, xanax and a mood stabilizer she could not remember. Patient reports Xanax made her numb and emotionless. Patient reports in 2017 she was admitted to [facility] for 1 week for SI and treated with anxiety medication and mood stabilizer.  Patient reports being on medication regimen for a year and stopped medication because she felt better. Patient reports being unmedicated since 2018.     Patient rates anxiety 10 and states anxious symptoms include impulsiveness, racing thoughts, irritability, over thinking, easily frustrated, fidgety, increased worrying, and racing heart. Patient scored 17 on ESVIN-7, indicating severe symptoms of anxiety. Patient reports history of panic attacks. Patient reports having a panic attack yesterday, but hadn't had one before that in 2 years. Patient reports symptoms of a panic attack include shortness of breath, increased heart rate, difficulty thinking, and thoughts of worst case scenario. Patient denies anger outbursts, and lashing out. Patient reports easily irritated over small things. Patient reports exaggerated sense of well being and self-confidence. Patient reports feeling on top the world. Patient reports manic episodes  "last days to weeks. Patient reports decreased need for sleep during manic episodes. Patient reports being able to function on 2-4 hours of sleep. Patient reports impulsive and risk-taking behaviors.  Patient reports impulsive spending without paying for bills first and booking impulsive flights. Patient states \"I choose quick hit of dopamine instead of my bills\". Patient reports impulsive decisions are causing financial strain. Patient reports mood cycling 2-3 times a month. Patient reports concern for OCD tendencies including plucking eyebrows and twirling hair when nervous or anxious. Patient reports occasional marijuana use and social alcohol use. Patient reports vaping nicotine daily and states \"it can amp me up\". Patient reports difficulty being present for other people when patient is stressed/anxious. Patient reports hyper fixating with racing and spiraling thoughts involving negativity. Patient reports experiencing paranoia in regards to \"constantly feeling like people are upset with me or mad at me\". Patient reports being easily distracted, difficulty with prioritization, difficulty completing projects, short attention span, and frequently interrupts others in personal and occupational life.    Patient rates depression 8/10 and states depressive symptoms include social isolation, decreased energy, difficulty focusing, procrastination, difficulty getting out of bed, difficulty completing ADLs and decreased motivation. Patient reports depressive episodes last weeks to months. Patient reports feeling absent from both home and work life. Patient reports difficulty of saying no. Patient scored 19 on the PHQ-9, indicating moderate to severe symptoms of depression. Patient reports current SI. Patient reports having a safety plan in place and able to call 911, text [phone] or go to ER if thoughts intent or plan develop. Patient denies HI/AVH.    Current Medications for MHI:   Denies    Current Treatments/Therapy for " MHI:    Currently [facility]    Subjective      Review of Systems:     Denies seizure, focal weakness, changes in vision, paresthesia’s, or numbness, headache, and neck stiffness. Reports history of cluster migraines in highschool but have resolved, followed up with neurologist.  Denies cough, sputum, wheezing, hemoptysis   Denies chest pain, palpitations, orthopnea   Denies abdominal pain, nausea, vomiting, diarrhea, and constipation   Denies dysuria, urgency, changes in frequency and hematuria   Denies fever and chills   Denies skin rash. Reports hair loss and edema and has followed up with provider  Denies ecchymoses and bleeding   Denies heat or cold intolerance, polydipsia, and polyuria  Denies abnormal movements, tics, and tremors  Reports weight gain of 50lbs over past 1-2 years     Depression Screening:  Patient screened positive for depression based on a PHQ-9 score of 19 on 10/30/2024. Follow-up recommendations include: Prescribed antidepressant medication treatment.      PHQ-9 Depression Screening  Little interest or pleasure in doing things? Over half   Feeling down, depressed, or hopeless? Several days   PHQ-2 Total Score 3   Trouble falling or staying asleep, or sleeping too much? Almost all   Feeling tired or having little energy? Almost all   Poor appetite or overeating? Over half   Feeling bad about yourself - or that you are a failure or have let yourself or your family down? Over half   Trouble concentrating on things, such as reading the newspaper or watching television? Over half   Moving or speaking so slowly that other people could have noticed? Or the opposite - being so fidgety or restless that you have been moving around a lot more than usual? Almost all   Thoughts that you would be better off dead, or of hurting yourself in some way? Several days   PHQ-9 Total Score 19   If you checked off any problems, how difficult have these problems made it for you to do your work, take care of things  at home, or get along with other people? Very difficult          ESVIN-7      Over the last two weeks, how often have you been bothered by the following problems?  Feeling nervous, anxious or on edge: Nearly every day  Not being able to stop or control worrying: Nearly every day  Worrying too much about different things: Nearly every day  Trouble Relaxing: Nearly every day  Being so restless that it is hard to sit still: More than half the days  Becoming easily annoyed or irritable: More than half the days  Feeling afraid as if something awful might happen: Several days  ESVIN 7 Total Score: 17  If you checked any problems, how difficult have these problems made it for you to do your work, take care of things at home, or get along with other people: Very difficult    SUICIDE RISK ASSESSMENT/CSSRS:  1. Does client have thoughts /of suicide? yes  2. Does client have intent for suicide? no  3. Does client have a current plan for suicide? no  4. History of suicide attempts: no attempts, Severe SI of drowning, cutting neck  5. Family history of suicide or attempts: no  6. History of violent behaviors towards others or property or thoughts of committing suicide: no  7. History of sexual aggression toward others: no  8. Access to firearms or weapons: no    Past Psychiatric History:   History of outpatient psychiatrist: yes  Diagnoses: Anxiety, Depression, possible Bipolar  History of outpatient therapy: yes  Previous Inpatient hospitalizations: yes  Previous medication trials: Citalopram, Xanax, and mod stabilizer  History of suicide/self harm attempts: no, severe SI    Review of Psychiatric Systems:  Mood (depression, macrina/hypomania): Depressive symptoms, manic and impulsive behaviors  Psychosis/thought disturbances: Denies  Anxiety/panic: increased anxiety, panic attacks  Obsessions and compulsions: plucking eyebrows, twirling hair  Abuse (verbal/physical/sexual) /Trauma: Denies  Dissociation: frequently feels like floating  "out of body and not present in life  Somatic concerns: colorectal issues have cause \"spiraling\" of moods  Appetite: varies with mood  Sleep pattern: 2-4 hours of sleep per night.  Personality disorders: Denies    Abuse/trauma History:              Physical: no              Sexual: no              Emotional/Neglect: no              Significant death/loss: Grandmother              Other trauma: Denies              Head Injury/Seizures:no  Triggers: (Persons/Places/Things/Events/Thought/Emotions): \"not feeling heard\"     Substance Abuse History/Last use:              Alcohol: yes socially              Tobacco/Vape: yes Vapes nicotine daily               Illicit Drugs: yes occasional marijuana               Caffeine: yes, daily               Seizures:no    Obstetrics:   LMP: regular, last one last week   Birth control: Denies  Pregnancy:Denies  Breast Feeding: Denies    Legal History:   No legal history noted today.      Educational and Occupational History:               Highest level of education obtained: college               History? no              Patient's Occupation: full time    Interpersonal/Relational:              Marital Status: single              Support system: friends and family     Social History:  Where was patient born: [place], [place]   Upbringing: Mother and Father  Where does patient currently live: [place], [place]   Living situation: lives with pets   Leisure and Recreation: rock climbing and hiking   Yarsanism: Denies  Developmental history: All milestones met yes    Family History:   Family History   Problem Relation Age of Onset    Hypertension Mother     Diabetes Mother     Anxiety disorder Mother     COPD Mother     Depression Mother     Mental illness Mother     Hypertension Father     Liver disease Father     Alcohol abuse Father     Hypertension Sister     Cancer Maternal Grandmother     Diabetes Maternal Grandmother     Cancer Maternal Grandfather     Cancer Paternal Grandmother     " "Cancer Paternal Grandfather        Family Psychiatric History:   Psych Diagnosis:    Mother-depression  History of suicide/self harm attempts: Denies   History of Substance abuse:    Father-alcohol abuse    Past Medical History:   Past Medical History:   Diagnosis Date    ADHD (attention deficit hyperactivity disorder) 2024    Therapist noted in sessions earlier this year that I exhibit symptoms of OCD or ADHD    Anxiety 2017    Extensive- happy to chat in person.    Asthma     Depression 2017       Past Surgical History:   Past Surgical History:   Procedure Laterality Date    COLONOSCOPY  2024       Medications:     Current Outpatient Medications:     albuterol sulfate  (90 Base) MCG/ACT inhaler, Inhale 2 puffs Every 6 (Six) Hours As Needed for Shortness of Air or Wheezing., Disp: 18 g, Rfl: 2    hydrocortisone (ANUSOL-HC) 25 MG suppository, Insert 1 suppository into the rectum 2 (Two) Times a Day As Needed for Hemorrhoids., Disp: 24 each, Rfl: 0    Hydrocortisone, Perianal, (ANUSOL-HC) 2.5 % rectal cream, APPLY 1 APPLICATION RECTAL ROUTE 2 TO 3 TIMES PER DAY AS NEEDED FOR HEMORRHOIDS, Disp: , Rfl:     naproxen (NAPROSYN) 500 MG tablet, Please see attached for detailed directions, Disp: , Rfl:     polyethylene glycol (MIRALAX) 17 GM/SCOOP powder, Take 17 g by mouth Daily., Disp: , Rfl:     Cariprazine HCl (VRAYLAR) 3 MG capsule capsule, Take 1 capsule by mouth Daily for 60 days., Disp: 30 capsule, Rfl: 1    Medication Considerations:  BRENDAN reviewed and appropriate.      Herbals and supplements: Denies     Allergies:   Allergies   Allergen Reactions    Cinnamon Rash    Citrus Rash       Objective     Physical Exam:  Vital Signs:   Vitals:    10/30/24 0913   BP: 102/74   Pulse: 84   SpO2: 98%   Weight: 89.8 kg (198 lb)   Height: 162.6 cm (64.02\")     Body mass index is 33.97 kg/m².     Mental Status Exam:   MENTAL STATUS EXAM   General Appearance:  Cleanly groomed and dressed and well developed  Eye Contact: "  Good eye contact  Attitude:  Cooperative  Motor Activity:  Normal gait, posture and fidgety  Muscle Strength:  Normal  Speech:  Hyper talkative and other  Other Comment:  Normal tone and volume  Language:  Spontaneous  Mood and affect:  Anxious and depressed  Hopelessness:  8  Loneliness: 10  Thought Process:  Logical  Associations/ Thought Content:  No delusions  Hallucinations:  None  Suicidal Ideations:  Specific thoughts but denies intent and other  Other Comment:  Denies current intent or plan.  Patient reports being able to reach out to call 911 or go to nearest ER if thoughts increase or intent or plan develop  Homicidal Ideation:  Not present  Sensorium:  Alert and clear  Orientation:  Person, place, time and situation  Immediate Recall, Recent, and Remote Memory:  Intact  Attention Span/ Concentration:  Good  Fund of Knowledge:  Appropriate for age and educational level  Intellectual Functioning:  Average range  Insight:  Good  Judgement:  Fair  Reliability:  Fair  Impulse Control:  Fair       @RESULASTCBCDIFFPANEL,TSH,LABLIPI,RJMHORAL05,GLFGQEGH65,MG,FOLATE,PROLACTIN,CRPRESULT,CMP,[id number])@    Lab Results   Component Value Date    GLUCOSE 92 09/04/2024    BUN 8 09/04/2024    CREATININE 0.75 09/04/2024    EGFR 110.7 09/04/2024    BCR 10.7 09/04/2024    K 4.3 09/04/2024    CO2 23.0 09/04/2024    CALCIUM 9.1 09/04/2024    ALBUMIN 4.4 09/04/2024    BILITOT 0.2 09/04/2024    AST 20 09/04/2024    ALT 14 09/04/2024       Lab Results   Component Value Date    WBC 8.06 09/04/2024    HGB 13.8 09/04/2024    HCT 41.2 09/04/2024    MCV 87.7 09/04/2024     09/04/2024       Lab Results   Component Value Date    CHOL 143 09/04/2024    TRIG 64 09/04/2024    HDL 42 09/04/2024    LDL 88 09/04/2024       The following data was reviewed by: ENRIQUE Bauman on 10/30/2024:         Assessment / Plan      Visit Diagnosis/Orders Placed This Visit:  Diagnoses and all orders for this visit:    1. Therapeutic drug  monitoring (Primary)  -     ToxAssure Flex 23, Ur -; Future  -     ToxAssure Flex 23, Ur - Urine, Clean Catch    2. Bipolar disorder, current episode mixed, moderate  -     Cariprazine HCl (VRAYLAR) 3 MG capsule capsule; Take 1 capsule by mouth Daily for 60 days.  Dispense: 30 capsule; Refill: 1    3. Generalized anxiety disorder    Other orders  -     Discontinue: Cariprazine HCl (Vraylar) 1.5 MG capsule capsule; Take 1 capsule by mouth Daily. For 7 days  Dispense: 7 capsule; Refill: 0       Prognosis: Good with Ongoing Treatment  Patient's diagnoses include bipolar disorder and ESVIN. Unique factors influencing symptom alleviation/remission include: pre-existing conditions, symptom chronicity, symptom severity, degree of impairment, social support, financial security, motivation, patient engagement and medication adherence. Prognosis is largely dependent on patient's adherence to medication treatment plan, follow up appointments and willingness to engage in psychotherapy      Functional Status: Moderate impairment     Impression/Formulation: Patient appeared alert and oriented. Patient's major concerns for today's visit include increased symptoms of anxiety and depression and mood stabilization.      Treatment and medication options discussed during today's visit.  Opportunity provided for any necessary clarification and patient questions. Patient acknowledges and verbally consents to proceed with mutually agreed upon treatment plan. Patient is voluntarily requesting to begin outpatient psychiatric treatment at [facility] [address] [facility]. Patient is receptive to assistance with maintaining a stable lifestyle. Patient presents with history of     ICD-10-CM ICD-9-CM   1. Therapeutic drug monitoring  Z51.81 V58.83   2. Bipolar disorder, current episode mixed, moderate  F31.62 296.62   3. Generalized anxiety disorder  F41.1 300.02   .    Reviewed patient's previous provider notes. Reviewed  most recent labs. Patient meets DSM V diagnostic criteria for diagnoses. Diagnoses may be updated as more information becomes available.       Differential diagnoses include: MDD     Treatment Plan:     Patient was provided with 1 box of Vraylar 1.5 mg samples   Initiate Vraylar 1.5mg for 7 days then increase dose to 3mg PO qd   Follow-up in 6 weeks and as needed   Encouraged to continue psychotherapy    Patient will continue supportive psychotherapy efforts and medications as prescribed. Provider instructed patient to obtain psychiatric medication from this provider only to prevent polypharmacy and possible overprescribing or unsafe medication combinations. Clinic will obtain release of information for current treatment team for continuity of care as needed. Patient will contact this office, call 911 or present to the nearest emergency room should suicidal or homicidal ideations occur. Discussed medication options and treatment plan of prescribed medication(s) as well as the risks, benefits, and potential side effects. Patient acknowledged and verbally consented to continue with current treatment plan and was educated on the importance of compliance with treatment and follow-up appointments.      MEDICATION Treatment: Discussed medication treatment options and plan of prescribed medication. Potential risks, benefits, and side effects including but not limited to the following reviewed: Black Box warnings, worsening symptoms, SI, sedation, GI side effects, metabolic alterations and blood pressure fluctuations. Patient is reminded to refrain from illicit substance use, including alcohol and THC while taking medications. Also advised to refrain from activity requiring alertness until sedative effects of medication are assessed.      Short-term goals: Patient will adhere to medication regimen and experience continued improvement in symptoms over the next 3 months.   Long-term goals: Patient will adhere to medication  treatment plan and report improvement in symptoms over the next 6 months    Quality Measures:     TOBACCO USE:  Tobacco Use: Medium Risk (10/30/2024)    Patient History     Smoking Tobacco Use: Former     Smokeless Tobacco Use: Never     Passive Exposure: Past      Former smoker    I advised Tonya of the risks of tobacco use.     Follow Up:   Return in about 6 weeks (around 12/11/2024).    ENRIQUE Bauman, PMHNP-[facility] [address]

## 2024-11-04 LAB
1OH-MIDAZOLAM UR QL SCN: NOT DETECTED NG/MG CREAT
6MAM UR QL SCN: NEGATIVE NG/ML
7AMINOCLONAZEPAM/CREAT UR: NOT DETECTED NG/MG CREAT
A-OH ALPRAZ/CREAT UR: NOT DETECTED NG/MG CREAT
A-OH-TRIAZOLAM/CREAT UR CFM: NOT DETECTED NG/MG CREAT
ACP UR QL CFM: NOT DETECTED
ALPRAZ/CREAT UR CFM: NOT DETECTED NG/MG CREAT
AMPHETAMINES UR QL SCN: NEGATIVE NG/ML
APAP UR QL SCN: NEGATIVE UG/ML
BARBITURATES UR QL SCN: NEGATIVE NG/ML
BENZODIAZ SCN METH UR: NEGATIVE
BUPRENORPHINE UR QL SCN: NEGATIVE
BUPRENORPHINE/CREAT UR: NOT DETECTED NG/MG CREAT
CANNABINOIDS UR QL CFM: NORMAL
CANNABINOIDS UR QL SCN: NORMAL NG/ML
CARBOXYTHC UR CFM-MCNC: 9 NG/MG CREAT
CARISOPRODOL UR QL: NEGATIVE NG/ML
CLONAZEPAM/CREAT UR CFM: NOT DETECTED NG/MG CREAT
COCAINE+BZE UR QL SCN: NEGATIVE NG/ML
CREAT UR-MCNC: 165 MG/DL
D-METHORPHAN UR-MCNC: NOT DETECTED NG/ML
D-METHORPHAN+LEVORPHANOL UR QL: NOT DETECTED
DESALKYLFLURAZ/CREAT UR: NOT DETECTED NG/MG CREAT
DIAZEPAM/CREAT UR: NOT DETECTED NG/MG CREAT
ETHANOL UR QL SCN: NEGATIVE G/DL
ETHANOL UR QL SCN: NEGATIVE NG/ML
FENTANYL CTO UR SCN-MCNC: NEGATIVE NG/ML
FENTANYL/CREAT UR: NOT DETECTED NG/MG CREAT
FLUNITRAZEPAM UR QL SCN: NOT DETECTED NG/MG CREAT
GABAPENTIN UR-MCNC: NEGATIVE UG/ML
HALLUCINOGENS UR: NEGATIVE
HYPNOTICS UR QL SCN: NEGATIVE
KETAMINE UR QL: NOT DETECTED
LORAZEPAM/CREAT UR: NOT DETECTED NG/MG CREAT
MEPERIDINE UR QL SCN: NEGATIVE NG/ML
METHADONE UR QL SCN: NEGATIVE NG/ML
METHADONE+METAB UR QL SCN: NEGATIVE NG/ML
MIDAZOLAM/CREAT UR CFM: NOT DETECTED NG/MG CREAT
MISCELLANEOUS, UR: NEGATIVE
NORBUPRENORPHINE/CREAT UR: NOT DETECTED NG/MG CREAT
NORDIAZEPAM/CREAT UR: NOT DETECTED NG/MG CREAT
NORFENTANYL/CREAT UR: NOT DETECTED NG/MG CREAT
NORFLUNITRAZEPAM UR-MCNC: NOT DETECTED NG/MG CREAT
NORKETAMINE UR-MCNC: NOT DETECTED UG/ML
OPIATES UR SCN-MCNC: NEGATIVE NG/ML
OXAZEPAM/CREAT UR: NOT DETECTED NG/MG CREAT
OXYCODONE CTO UR SCN-MCNC: NEGATIVE NG/ML
PCP UR QL SCN: NEGATIVE NG/ML
PRESCRIBED MEDICATIONS: NORMAL
PROPOXYPH UR QL SCN: NEGATIVE NG/ML
TAPENTADOL CTO UR SCN-MCNC: NEGATIVE NG/ML
TEMAZEPAM/CREAT UR: NOT DETECTED NG/MG CREAT
TRAMADOL UR QL SCN: NEGATIVE NG/ML
ZALEPLON UR-MCNC: NOT DETECTED NG/ML
ZOLPIDEM PHENYL-4-CARB UR QL SCN: NOT DETECTED
ZOLPIDEM UR QL SCN: NOT DETECTED
ZOPICLONE-N-OXIDE UR-MCNC: NOT DETECTED NG/ML

## 2024-11-22 ENCOUNTER — TELEPHONE (OUTPATIENT)
Dept: INTERNAL MEDICINE | Facility: CLINIC | Age: 29
End: 2024-11-22
Payer: COMMERCIAL

## 2024-11-22 NOTE — TELEPHONE ENCOUNTER
PT CALLED AND STATED SHE HAD SOME LABS DONE WITH [facility] AND NEEDS TO TALK TO SOMEONE ABOUT THE RESULTS. ASAP.

## 2024-11-25 ENCOUNTER — TELEPHONE (OUTPATIENT)
Age: 29
End: 2024-11-25
Payer: COMMERCIAL

## 2024-11-25 NOTE — TELEPHONE ENCOUNTER
Patient reports increased restlessness with Vraylar medication when increased to 3 mg.  Patient denies symptoms of restlessness when taking 1.5 mg of medication.  Patient reports moods are more stabilized with medication.  Patient and provider discussed decreasing Vraylar dose to 1.5 mg to see if negative side effects resolved.  Patient has a follow-up appointment scheduled if other medication options need to be addressed.  Patient will contact provider if side effects do not resolve or if any questions or concerns arise.

## 2024-11-25 NOTE — TELEPHONE ENCOUNTER
Called patient back regarding results for urine drug screen. Patient had concerns because she say the results on mychart and say the creatinine level was at 165 when she typicaly has a result with a decimal point. I let patient know that blood serum creatinine levels and urine creatinine levels are interpreted differently, and that hers is within normal limits. Patient noted understanding and will give us a call if she has any further questions.

## 2024-11-26 ENCOUNTER — TELEMEDICINE (OUTPATIENT)
Dept: FAMILY MEDICINE CLINIC | Facility: CLINIC | Age: 29
End: 2024-11-26
Payer: COMMERCIAL

## 2024-11-26 VITALS — BODY MASS INDEX: 33.97 KG/M2 | HEIGHT: 64 IN

## 2024-11-26 DIAGNOSIS — F31.70 BIPOLAR DISORDER IN PARTIAL REMISSION, MOST RECENT EPISODE UNSPECIFIED TYPE: Primary | ICD-10-CM

## 2024-11-26 DIAGNOSIS — E66.811 CLASS 1 OBESITY WITHOUT SERIOUS COMORBIDITY WITH BODY MASS INDEX (BMI) OF 33.0 TO 33.9 IN ADULT, UNSPECIFIED OBESITY TYPE: ICD-10-CM

## 2024-11-26 PROCEDURE — 99214 OFFICE O/P EST MOD 30 MIN: CPT | Performed by: STUDENT IN AN ORGANIZED HEALTH CARE EDUCATION/TRAINING PROGRAM

## 2024-11-26 NOTE — PROGRESS NOTES
Virtual Video Visit      Date: 2024   Patient Name: Tonya Sanderson  : 1995   MRN: [id number]     Chief Complaint:    Chief Complaint   Patient presents with    Mental Health Problem    Obesity       I have reviewed the E-Visit questionnaire and the patient's answers, my assessment and plan are listed below.     This provider is located at the home address on file with [facility]. using a secure [employer] Video Visit through [employer]. Patient is being seen remotely via telehealth at their home address in [place], and stated they are in a secure environment for this session. The patient's condition being diagnosed/treated is appropriate for telemedicine. The provider identified herself as well as her credentials. The patient, and/or patients guardian, consent to be seen remotely, and when consent is given they understand that the consent allows for patient identifiable information to be sent to a third party as needed. They may refuse to be seen remotely at any time. The electronic data is encrypted and password protected, and the patient and/or guardian has been advised of the potential risks to privacy not withstanding such measures.    You have chosen to receive care through a virtual video visit. Do you consent to use a video/audio connection for your medical care today? Yes    History of Present Illness: Tonya Sanderson is a 29 y.o. female  with obesity, dysmenorrhea, anxiety, bipolar disorder who is seen today to follow up with obesity and mood.    She reports improvement in overall mental health. Following with behavioral health now and recently started vraylar. Other than some restlessness she reports overall happy with this medication. Helping with her motivation and finding more elisha in her day to day.     She reports room for improvement in diet and exercise. She had to pause her gym membership due to knee pain related to fall. This has improved. She continues to struggle with  "snacking and binge eating at night.     She has been wearing compression socks and feels this is helpful for LE edema.      Subjective      Review of Systems:   Review of Systems    I have reviewed and the following portions of the patient's history were updated as appropriate: past family history, past medical history, past social history, past surgical history and problem list.    Medications:     Current Outpatient Medications:     albuterol sulfate  (90 Base) MCG/ACT inhaler, Inhale 2 puffs Every 6 (Six) Hours As Needed for Shortness of Air or Wheezing., Disp: 18 g, Rfl: 2    Cariprazine HCl (VRAYLAR) 1.5 MG capsule capsule, Take 1 capsule by mouth Daily., Disp: 30 capsule, Rfl: 1    hydrocortisone (ANUSOL-HC) 25 MG suppository, Insert 1 suppository into the rectum 2 (Two) Times a Day As Needed for Hemorrhoids., Disp: 24 each, Rfl: 0    Hydrocortisone, Perianal, (ANUSOL-HC) 2.5 % rectal cream, APPLY 1 APPLICATION RECTAL ROUTE 2 TO 3 TIMES PER DAY AS NEEDED FOR HEMORRHOIDS, Disp: , Rfl:     naproxen (NAPROSYN) 500 MG tablet, Please see attached for detailed directions, Disp: , Rfl:     polyethylene glycol (MIRALAX) 17 GM/SCOOP powder, Take 17 g by mouth Daily., Disp: , Rfl:     Allergies:   Allergies   Allergen Reactions    Cinnamon Rash    Citrus Rash       Objective     Physical Exam:  Vital Signs:   Vitals:    11/26/24 0855   Height: 162.6 cm (64.02\")     Body mass index is 33.97 kg/m².    Physical Exam  Vitals reviewed.   Constitutional:       Appearance: Normal appearance. She is obese.   Pulmonary:      Effort: Pulmonary effort is normal. No respiratory distress.   Neurological:      Mental Status: She is alert.   Psychiatric:         Mood and Affect: Mood normal.         Behavior: Behavior normal.         Judgment: Judgment normal.           Assessment / Plan      Assessment/Plan:   Diagnoses and all orders for this visit:    1. Bipolar disorder in partial remission, most recent episode unspecified " type (Primary)  Reviewed consult notes/recommendations per most recent visit with behavioral health. Improving with Vraylar - agree with 1.5mg daily, discussed if needing to increase but unable to tolerate restlessness, could consider 3mg every other day.  Continue management per behavioral health  Discussed importance of healthy coping mechanism and incorporating exercise/outdoor exposure to help with mental health    2. Class 1 obesity without serious comorbidity with body mass index (BMI) of 33.0 to 33.9 in adult, unspecified obesity type  Counseled patient on importance of weight loss and maintaining healthy lifestyle. Recommended calorie deficit and 150 minutes of exercise per week.  Discussed specific goals of 30g protein per meal, incorporation of fruit/vegetable with each meal. Discussed exercise goals - start with 15-20 min walking 3x per week and build from this to develop habits.        Follow Up:   Return in about 3 months (around 2/26/2025) for Recheck weight and mood .    Any medications prescribed have been sent electronically to patient's preferred pharmacy     Indigo Mitchell DO  [facility]  11/26/24  09:09 EST

## 2024-12-11 ENCOUNTER — OFFICE VISIT (OUTPATIENT)
Age: 29
End: 2024-12-11
Payer: COMMERCIAL

## 2024-12-11 VITALS
BODY MASS INDEX: 36.2 KG/M2 | WEIGHT: 211 LBS | OXYGEN SATURATION: 99 % | DIASTOLIC BLOOD PRESSURE: 74 MMHG | SYSTOLIC BLOOD PRESSURE: 112 MMHG | HEART RATE: 89 BPM

## 2024-12-11 DIAGNOSIS — F31.62 BIPOLAR DISORDER, CURRENT EPISODE MIXED, MODERATE: Primary | ICD-10-CM

## 2024-12-11 DIAGNOSIS — F41.1 GENERALIZED ANXIETY DISORDER: ICD-10-CM

## 2024-12-11 RX ORDER — HYDROXYZINE HYDROCHLORIDE 25 MG/1
25 TABLET, FILM COATED ORAL NIGHTLY PRN
Qty: 30 TABLET | Refills: 2 | Status: SHIPPED | OUTPATIENT
Start: 2024-12-11

## 2024-12-11 NOTE — PROGRESS NOTES
Follow Up Office Visit      Patient Name: Tonya Sanderson  : 1995   MRN: [id number]     Referring Provider: Indigo Mitchell DO    Chief Complaint:      ICD-10-CM ICD-9-CM   1. Bipolar disorder, current episode mixed, moderate  F31.62 296.62   2. Generalized anxiety disorder  F41.1 300.02        History of Present Illness:   Tonya Sanderson is a 29 y.o. female who is here today for follow up and medication management.      Subjective      Patient Reports:   Patient reports medication has helped manage current symptoms. Patient reports the decreased dose of Vraylar to 1.5mg reduced initial side effects. Patient reports taking medication at night. Patient reports she was feeling extremely motivated on 3mg but caused increased restlessness, couldn't get comfortable, and twitching of the eye.  Patient reports better focus, decreased irritability, decreased anxiousness and feeling more relaxed. Patient reports difficulty sleeping and has been taking melatonin at night which has helped improve sleep, but often causes daytime sleepiness.    Patient rates anxiety 5/10. Patient denies panic attacks. Patient denies anger outburst and lashing out. Patient reports decreased mood cycling, 1-2 times per month. Patient report occasional exaggerated sense of well being and self-confidences. Patient reports occasional decreased need for sleep, functioning off of 1-2 hours per night during manic episodes. Patient reports continuous impulsive spending that causes financial strain and impulsive decision making, in regards to saying yes to things when she should say no.  Patient denies risk-taking behaviors.    Patient rates depression 6/10.  Patient reports last depressive episode was last week when spiked by a recent conversation. Patient reports she is able to come out of the depressive episodes easier than before medication. Patient denies SI/HI/AVH.    PHQ-9= 12 decreased score from previous visit  ESVIN-7= 9 decreased  score from previous visit    Review of Systems:   Review of Systems   Psychiatric/Behavioral:  Positive for dysphoric mood. The patient is nervous/anxious.    Sleep pattern: 6-8 hours per night   Appetite: normal     PHQ-9 Depression Screening  Little interest or pleasure in doing things? Several days   Feeling down, depressed, or hopeless? Several days   PHQ-2 Total Score 2   Trouble falling or staying asleep, or sleeping too much? Over half   Feeling tired or having little energy? Several days   Poor appetite or overeating? Over half   Feeling bad about yourself - or that you are a failure or have let yourself or your family down? Several days   Trouble concentrating on things, such as reading the newspaper or watching television? Several days   Moving or speaking so slowly that other people could have noticed? Or the opposite - being so fidgety or restless that you have been moving around a lot more than usual? Over half   Thoughts that you would be better off dead, or of hurting yourself in some way? Several days   PHQ-9 Total Score 12   If you checked off any problems, how difficult have these problems made it for you to do your work, take care of things at home, or get along with other people? Somewhat difficult       ESVIN-7 Anxiety Screening  Over the last two weeks, how often have you been bothered by the following problems?  Feeling nervous, anxious or on edge: Several days  Not being able to stop or control worrying: Several days  Worrying too much about different things: Several days  Trouble Relaxing: More than half the days  Being so restless that it is hard to sit still: More than half the days  Becoming easily annoyed or irritable: Several days  Feeling afraid as if something awful might happen: Several days  ESVIN 7 Total Score: 9  If you checked any problems, how difficult have these problems made it for you to do your work, take care of things at home, or get along with other people: Somewhat  difficult    RISK ASSESSMENT:  Patient denies any thoughts or intent of suicide today. Patient denies any impulsive behavior today.     Medications:     Current Outpatient Medications:     albuterol sulfate  (90 Base) MCG/ACT inhaler, Inhale 2 puffs Every 6 (Six) Hours As Needed for Shortness of Air or Wheezing., Disp: 18 g, Rfl: 2    Cariprazine HCl (VRAYLAR) 1.5 MG capsule capsule, Take 1 capsule by mouth Daily., Disp: 30 capsule, Rfl: 2    hydrocortisone (ANUSOL-HC) 25 MG suppository, Insert 1 suppository into the rectum 2 (Two) Times a Day As Needed for Hemorrhoids., Disp: 24 each, Rfl: 0    Hydrocortisone, Perianal, (ANUSOL-HC) 2.5 % rectal cream, APPLY 1 APPLICATION RECTAL ROUTE 2 TO 3 TIMES PER DAY AS NEEDED FOR HEMORRHOIDS, Disp: , Rfl:     naproxen (NAPROSYN) 500 MG tablet, Please see attached for detailed directions, Disp: , Rfl:     polyethylene glycol (MIRALAX) 17 GM/SCOOP powder, Take 17 g by mouth Daily., Disp: , Rfl:     hydrOXYzine (ATARAX) 25 MG tablet, Take 1 tablet by mouth At Night As Needed for Anxiety., Disp: 30 tablet, Rfl: 2    Medication Considerations:  BRENDAN reviewed and appropriate.      Allergies:   Allergies   Allergen Reactions    Cinnamon Rash    Citrus Rash       Results Reviewed:     Objective     Physical Exam:  Vital Signs:   Vitals:    12/11/24 1005   BP: 112/74   Pulse: 89   SpO2: 99%   Weight: 95.7 kg (211 lb)     Body mass index is 36.2 kg/m².     Mental Status Exam:   MENTAL STATUS EXAM   General Appearance:  Cleanly groomed and dressed and well developed  Eye Contact:  Good eye contact  Attitude:  Cooperative  Motor Activity:  Normal gait, posture and fidgety  Muscle Strength:  Normal  Speech:  Normal rate, tone, volume  Language:  Spontaneous  Mood and affect:  Depressed and anxious  Hopelessness:  Denies  Loneliness: 3  Thought Process:  Logical  Associations/ Thought Content:  No delusions  Hallucinations:  None  Suicidal Ideations:  Not present  Homicidal  Ideation:  Not present  Sensorium:  Alert and clear  Orientation:  Person, place, time and situation  Immediate Recall, Recent, and Remote Memory:  Intact  Attention Span/ Concentration:  Good  Fund of Knowledge:  Appropriate for age and educational level  Intellectual Functioning:  Average range  Insight:  Good  Judgement:  Fair  Reliability:  Good  Impulse Control:  Fair       @RESULASTCBCDIFFPANEL,TSH,LABLIPI,[id number],FJJAZOUO88,MG,FOLATE,PROLACTIN,CRPRESULT,CMP,[id number])@    Lab Results   Component Value Date    GLUCOSE 92 09/04/2024    BUN 8 09/04/2024    CREATININE 0.75 09/04/2024    EGFR 110.7 09/04/2024    BCR 10.7 09/04/2024    K 4.3 09/04/2024    CO2 23.0 09/04/2024    CALCIUM 9.1 09/04/2024    ALBUMIN 4.4 09/04/2024    BILITOT 0.2 09/04/2024    AST 20 09/04/2024    ALT 14 09/04/2024       Lab Results   Component Value Date    WBC 8.06 09/04/2024    HGB 13.8 09/04/2024    HCT 41.2 09/04/2024    MCV 87.7 09/04/2024     09/04/2024       Lab Results   Component Value Date    CHOL 143 09/04/2024    TRIG 64 09/04/2024    HDL 42 09/04/2024    LDL 88 09/04/2024       Assessment / Plan      Visit Diagnosis/Orders Placed This Visit:  Diagnoses and all orders for this visit:    1. Bipolar disorder, current episode mixed, moderate (Primary)    2. Generalized anxiety disorder    Other orders  -     hydrOXYzine (ATARAX) 25 MG tablet; Take 1 tablet by mouth At Night As Needed for Anxiety.  Dispense: 30 tablet; Refill: 2  -     Cariprazine HCl (VRAYLAR) 1.5 MG capsule capsule; Take 1 capsule by mouth Daily.  Dispense: 30 capsule; Refill: 2       Functional Status: Mild impairment     Prognosis: Good with Ongoing Treatment     Impression/Formulation:  Patient appeared alert and oriented.  Patient is voluntarily requesting to continue outpatient psychiatric treatment at [facility] [address]  Patient is receptive to assistance with maintaining a stable lifestyle.  Patient  presents with history of     ICD-10-CM ICD-9-CM   1. Bipolar disorder, current episode mixed, moderate  F31.62 296.62   2. Generalized anxiety disorder  F41.1 300.02   .    Reviewed patient's previous provider notes. Reviewed most recent labs. Patient meets DSM V diagnostic criteria for diagnoses. Diagnoses may be updated as more information becomes available.       Treatment Plan:   Continue Vraylar 1.5mg po qd. refilled medication  Initiate hydroxyzine 25mg PO PRN for sleep   Encouraged to continue psychotherapy  Follow-up in 3 months and as needed    Patient will continue supportive psychotherapy efforts and medications as indicated. Clinic will obtain release of information for current treatment team for continuity of care as needed. Patient will contact this office, call 911 or present to the nearest emergency room should suicidal or homicidal ideations occur.  Discussed medication options and treatment plan of prescribed medication(s) as well as the risks, benefits, and potential side effects. Patient acknowledged and verbally consented to continue with current treatment plan and was educated on the importance of compliance with treatment and follow-up appointments.     Quality Measures:   Former smoker    I advised Tonya Kitchen of the risks of tobacco use.     Follow Up:   Return in about 3 months (around 3/11/2025).      ENRIQUE Bauman  [facility] [address]

## 2025-02-25 DIAGNOSIS — K60.2 ANAL FISSURE: ICD-10-CM

## 2025-02-25 DIAGNOSIS — K62.5 RECTAL BLEEDING: ICD-10-CM

## 2025-02-25 DIAGNOSIS — K59.09 CHRONIC CONSTIPATION: ICD-10-CM

## 2025-02-25 DIAGNOSIS — J45.20 MILD INTERMITTENT ASTHMA WITHOUT COMPLICATION: ICD-10-CM

## 2025-02-25 DIAGNOSIS — K64.4 ANAL SKIN TAG: ICD-10-CM

## 2025-02-26 RX ORDER — ALBUTEROL SULFATE 90 UG/1
2 INHALANT RESPIRATORY (INHALATION) EVERY 6 HOURS PRN
Qty: 18 G | Refills: 2 | Status: SHIPPED | OUTPATIENT
Start: 2025-02-26

## 2025-02-26 RX ORDER — HYDROCORTISONE ACETATE 25 MG/1
25 SUPPOSITORY RECTAL 2 TIMES DAILY PRN
Qty: 24 EACH | Refills: 0 | Status: SHIPPED | OUTPATIENT
Start: 2025-02-26

## 2025-03-13 ENCOUNTER — TELEMEDICINE (OUTPATIENT)
Age: 30
End: 2025-03-13
Payer: COMMERCIAL

## 2025-03-13 DIAGNOSIS — F41.1 GENERALIZED ANXIETY DISORDER: ICD-10-CM

## 2025-03-13 DIAGNOSIS — F31.62 BIPOLAR DISORDER, CURRENT EPISODE MIXED, MODERATE: Primary | ICD-10-CM

## 2025-03-13 RX ORDER — TRAZODONE HYDROCHLORIDE 50 MG/1
50 TABLET ORAL NIGHTLY PRN
Qty: 30 TABLET | Refills: 1 | Status: SHIPPED | OUTPATIENT
Start: 2025-03-13

## 2025-03-13 NOTE — PROGRESS NOTES
Follow Up Office Visit      Patient Name: Tonya Sanderson  : 1995   MRN: [id number]     Referring Provider: Indigo Mitchell DO    Chief Complaint:      ICD-10-CM ICD-9-CM   1. Bipolar disorder, current episode mixed, moderate  F31.62 296.62   2. Generalized anxiety disorder  F41.1 300.02        History of Present Illness:   Tonya Sanderson is a 29 y.o. female who is here today for follow up and medication management.      Mode of Visit: Video  Location of patient: -HOME-  Location of provider: +HOME+  You have chosen to receive care through a telehealth visit.  The patient has signed the video visit consent form.  The visit included audio and video interaction. No technical issues occurred during this visit.       Subjective      Patient Reports:   History of Present Illness  She reports a general improvement in her condition since her last visit in 2024, with a decrease in the frequency of mood cycling. She experienced a significant depressive episode lasting 3 to 4 days, which she attributes to a 3-day lapse in medication adherence. Upon resumption of her medication, she noticed an improvement in her symptoms. She rates her current anxiety levels at 6 out of 10. She rates her current levels of depression at 4 out of 10. She experienced intrusive suicidal ideations without intent or plan approximately 4 weeks ago but does not endorse any current feelings of hopelessness.  She reports this was the same time of her lapse in medication.  She reports mild feelings of loneliness. She has not experienced any recent panic attacks but acknowledges increased irritability and impulsivity, which she believes have improved over the past few months. She has been closely monitoring her bank account as a strategy to manage her impulsivity. She reports no changes in her physical health, apart from a recent illness. She also reports a decrease in restlessness compared to when she was on a 3 mg dose of  Vraylar. She is uncertain if her weight gain is a side effect of the medication or due to her recent sedentary lifestyle and increased snacking during a bout of pneumonia. She reports no significant side effects from her current medication regimen.    Her sleep quality remains poor, with an average of 4 to 6 hours per night. She does not believe that hydroxyzine, which she takes at night, is effective. During her pneumonia treatment, she was advised against concurrent use of hydroxyzine and steroids due to potential cardiac side effects. She has been using melatonin as an alternative, which she finds somewhat helpful.    She was down with pneumonia for 2 weeks and was on steroids. She had bronchitis, pneumonia and then got influenza A at the same time.     MEDICATIONS  Current: Vraylar, hydroxyzine, melatonin    PHQ-9= will assess at aF/U visit  ESVIN-7= will assess at aF/U visit    Review of Systems:   Review of Systems   Constitutional:  Negative for appetite change, fatigue and unexpected weight change.   Eyes:  Negative for visual disturbance.   Respiratory:  Negative for chest tightness and shortness of breath.    Cardiovascular:  Negative for chest pain.   Musculoskeletal:  Negative for gait problem.   Skin:  Negative for rash and wound.   Neurological:  Negative for dizziness, tremors, seizures, weakness, light-headedness and headaches.   Psychiatric/Behavioral:  Negative for agitation, behavioral problems, confusion, decreased concentration, dysphoric mood, hallucinations, self-injury, sleep disturbance and suicidal ideas. The patient is nervous/anxious. The patient is not hyperactive.      Sleep pattern: 4-6 hours per night   Appetite: normal        RISK ASSESSMENT:  Patient denies any thoughts or intent of suicide today. Patient denies any impulsive behavior today.     Medications:     Current Outpatient Medications:     Cariprazine HCl (VRAYLAR) 1.5 MG capsule capsule, Take 1 capsule by mouth Daily., Disp:  30 capsule, Rfl: 2    albuterol sulfate  (90 Base) MCG/ACT inhaler, Inhale 2 puffs Every 6 (Six) Hours As Needed for Shortness of Air or Wheezing., Disp: 18 g, Rfl: 2    hydrocortisone (ANUSOL-HC) 25 MG suppository, Insert 1 suppository into the rectum 2 (Two) Times a Day As Needed for Hemorrhoids., Disp: 24 each, Rfl: 0    Hydrocortisone, Perianal, (ANUSOL-HC) 2.5 % rectal cream, APPLY 1 APPLICATION RECTAL ROUTE 2 TO 3 TIMES PER DAY AS NEEDED FOR HEMORRHOIDS, Disp: , Rfl:     hydrOXYzine (ATARAX) 25 MG tablet, Take 1 tablet by mouth At Night As Needed for Anxiety., Disp: 30 tablet, Rfl: 2    naproxen (NAPROSYN) 500 MG tablet, Please see attached for detailed directions, Disp: , Rfl:     polyethylene glycol (MIRALAX) 17 GM/SCOOP powder, Take 17 g by mouth Daily., Disp: , Rfl:     traZODone (DESYREL) 50 MG tablet, Take 1 tablet by mouth At Night As Needed for Sleep., Disp: 30 tablet, Rfl: 1    Medication Considerations:  BRENDAN reviewed and appropriate.      Allergies:   Allergies   Allergen Reactions    Cinnamon Rash    Citrus Rash       Results Reviewed:   Yes     Objective     Physical Exam:  Vital Signs: There were no vitals filed for this visit.  There is no height or weight on file to calculate BMI.     Mental Status Exam:   MENTAL STATUS EXAM   General Appearance:  Cleanly groomed and dressed and well developed  Eye Contact:  Good eye contact  Attitude:  Cooperative  Motor Activity:  Other  Other Comment:  SINAN video visit  Muscle Strength:  Other  Other Comment:  SINAN video visit  Speech:  Normal rate, tone, volume  Language:  Spontaneous  Mood and affect:  Normal, pleasant  Hopelessness:  Denies  Loneliness: 3  Thought Process:  Logical  Associations/ Thought Content:  No delusions  Hallucinations:  None  Suicidal Ideations:  Not present  Homicidal Ideation:  Not present  Sensorium:  Alert and clear  Orientation:  Person, place, time and situation  Immediate Recall, Recent, and Remote Memory:   Intact  Attention Span/ Concentration:  Good  Fund of Knowledge:  Appropriate for age and educational level  Intellectual Functioning:  Average range  Insight:  Good  Judgement:  Good  Reliability:  Good  Impulse Control:  Fair       @RESULASTCBCDIFFPANEL,TSH,LABLIPI,[id number],JKSSSBNP09,MG,FOLATE,PROLACTIN,CRPRESULT,CMP,[id number])@    Lab Results   Component Value Date    GLUCOSE 92 09/04/2024    BUN 8 09/04/2024    CREATININE 0.75 09/04/2024    EGFR 110.7 09/04/2024    BCR 10.7 09/04/2024    K 4.3 09/04/2024    CO2 23.0 09/04/2024    CALCIUM 9.1 09/04/2024    ALBUMIN 4.4 09/04/2024    BILITOT 0.2 09/04/2024    AST 20 09/04/2024    ALT 14 09/04/2024       Lab Results   Component Value Date    WBC 8.06 09/04/2024    HGB 13.8 09/04/2024    HCT 41.2 09/04/2024    MCV 87.7 09/04/2024     09/04/2024       Lab Results   Component Value Date    CHOL 143 09/04/2024    TRIG 64 09/04/2024    HDL 42 09/04/2024    LDL 88 09/04/2024       Assessment / Plan      Visit Diagnosis/Orders Placed This Visit:  Diagnoses and all orders for this visit:    1. Bipolar disorder, current episode mixed, moderate (Primary)    2. Generalized anxiety disorder    Other orders  -     traZODone (DESYREL) 50 MG tablet; Take 1 tablet by mouth At Night As Needed for Sleep.  Dispense: 30 tablet; Refill: 1  -     Cariprazine HCl (VRAYLAR) 1.5 MG capsule capsule; Take 1 capsule by mouth Daily.  Dispense: 30 capsule; Refill: 2         Assessment & Plan  1. Bipolar disorder.  She reports a decrease in cycling and fewer manic episodes but experienced a significant depressive episode lasting about three to four days, which she attributes to missing her medication for three days. She has been more cognizant of her impulsive spending and has been monitoring her bank account closely. She continues to experience irritability and moments of panic, particularly related to work stress. She will continue her current regimen of Vraylar.    2.  Insomnia.  She reports that hydroxyzine is not effectively helping her sleep, and she was advised not to take it with steroids due to the risk of heart palpitations. She has been using melatonin, which provides some relief. She averages four to six hours of sleep per night.    3. Recent pneumonia.  She recently recovered from pneumonia and influenza A, which required multiple courses of steroids and antibiotics. The steroids may have contributed to her feeling of macrina and weight gain.      Functional Status: Mild impairment     Prognosis: Good with Ongoing Treatment     Impression/Formulation:  Patient appeared alert and oriented.  Patient is voluntarily requesting to continue outpatient psychiatric treatment at [facility] [address] [facility].  Patient is receptive to assistance with maintaining a stable lifestyle.  Patient presents with history of     ICD-10-CM ICD-9-CM   1. Bipolar disorder, current episode mixed, moderate  F31.62 296.62   2. Generalized anxiety disorder  F41.1 300.02   .    Reviewed patient's previous provider notes. Reviewed most recent labs. Patient meets DSM V diagnostic criteria for diagnoses. Diagnoses may be updated as more information becomes available.       Treatment Plan:   Continue Vraylar 1.5mg PO qd and hydroxyzine 25mg PO PRN. Refilled Vraylar  Initiate trazodone 25-50 mg PO qhs as needed for insomnia  Encouraged to continue psychotherapy  Follow-up in 3 months and as needed    Patient will continue supportive psychotherapy efforts and medications as indicated. Clinic will obtain release of information for current treatment team for continuity of care as needed. Patient will contact this office, call 911 or present to the nearest emergency room should suicidal or homicidal ideations occur.  Discussed medication options and treatment plan of prescribed medication(s) as well as the risks, benefits, and potential side effects. Patient acknowledged and verbally  consented to continue with current treatment plan and was educated on the importance of compliance with treatment and follow-up appointments.     All risks/benefits and side effects discussed with patient, including risk for weight gain, increased lipids, hyperprolactemia, EPS symptoms, including restlessness, muscle  stiffness or contraction of head, face, neck, trunk and limbs, and TD (which can be irreversible). Pt verbalizes understanding and consents to treatment with these medications.    Medication risks and side effects discussed with patient including risk for worsening mood, changes in behavior, thoughts of suicide or homicide, induction of macrina, serotonin syndrome, rare hyponatremia, rare SIADH, withdrawal symptoms if abrupt withdrawal, sexual dysfunction.   If any thoughts of SI or HI, worsening mood or changes in behavior, call 911 or crisis line [phone], or go to nearest ER at once. Patient agrees to notify close family/friend of new trial of antidepressants/info above. Pt.verbalizes understanding and consents to treatment with this medication.    Quality Measures:   Former smoker    I advised Tonya Kitchen of the risks of tobacco use.     Follow Up:   Return in about 6 weeks (around 4/24/2025).    Patient or patient representative verbalized consent for the use of Ambient Listening during the visit with  ENRIQUE Bauman for chart documentation. 3/13/2025  08:35 EDT    ENRIQUE Bauman  [facility] [address]

## 2025-04-24 ENCOUNTER — TELEMEDICINE (OUTPATIENT)
Age: 30
End: 2025-04-24
Payer: COMMERCIAL

## 2025-04-24 DIAGNOSIS — F41.1 GENERALIZED ANXIETY DISORDER: ICD-10-CM

## 2025-04-24 DIAGNOSIS — F31.62 BIPOLAR DISORDER, CURRENT EPISODE MIXED, MODERATE: Primary | ICD-10-CM

## 2025-04-24 RX ORDER — HYDROXYZINE HYDROCHLORIDE 25 MG/1
25 TABLET, FILM COATED ORAL NIGHTLY PRN
Qty: 30 TABLET | Refills: 2 | Status: SHIPPED | OUTPATIENT
Start: 2025-04-24
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
## 2025-06-10 RX ORDER — TRAZODONE HYDROCHLORIDE 50 MG/1
50 TABLET ORAL NIGHTLY PRN
Qty: 30 TABLET | Refills: 1 | Status: SHIPPED | OUTPATIENT
Start: 2025-06-10

## 2025-07-24 ENCOUNTER — TELEMEDICINE (OUTPATIENT)
Age: 30
End: 2025-07-24
Payer: COMMERCIAL

## 2025-07-24 DIAGNOSIS — F41.1 GENERALIZED ANXIETY DISORDER: ICD-10-CM

## 2025-07-24 DIAGNOSIS — F31.62 BIPOLAR DISORDER, CURRENT EPISODE MIXED, MODERATE: Primary | ICD-10-CM

## 2025-07-24 RX ORDER — TRAZODONE HYDROCHLORIDE 50 MG/1
50 TABLET ORAL NIGHTLY PRN
Qty: 30 TABLET | Refills: 1 | Status: SHIPPED | OUTPATIENT
Start: 2025-07-24

## 2025-07-24 RX ORDER — HYDROXYZINE HYDROCHLORIDE 25 MG/1
25 TABLET, FILM COATED ORAL NIGHTLY PRN
Qty: 30 TABLET | Refills: 2 | Status: SHIPPED | OUTPATIENT
Start: 2025-07-24

## 2025-07-24 NOTE — PROGRESS NOTES
Video Visit      Patient Name: Tonya Sanderson  : 1995   MRN: [id number]     Referring Provider: Indigo Mitchell DO    Chief Complaint:      ICD-10-CM ICD-9-CM   1. Bipolar disorder, current episode mixed, moderate  F31.62 296.62   2. Generalized anxiety disorder  F41.1 300.02          History of Present Illness:   Tonya Sanderson is a 30 y.o. female who is being seen by a video visit today for follow up and medication management.           Subjective   Patient Reports:   History of Present Illness  She reports a positive response to her medication, with the exception of experiencing occasional involuntary movement of her hand primarily at night when she is in a relaxed state. These movements are not a constant concern for her and do not occur every night. To address this side effect, she has reduced her medication intake to every other night, which seems to have mitigated the issue. Additionally, she has decreased use of trazodone and is attempting to establish a consistent sleep routine. She estimates her nightly sleep duration to be around 6 hours.    Her anxiety levels have been manageable, although they tend to escalate during periods of significant stress, particularly related to work and family issues. She does not endorse any recent feelings of depression, suicidal ideation, self-harm, or hallucinations. While she acknowledges feelings of loneliness, she does not feel hopeless. Over the past 4 months, she has experienced a few brief episodes of depressive lows, but these have not been severe enough to cause concern.    She has lost 21 pounds since starting semaglutide, which has effectively suppressed her appetite. She feels better overall and is still learning how to eat properly.    She is scheduled for another colonoscopy due to ongoing gastrointestinal issues, including anal fissures and hemorrhoids, which have been present since she was 24 years old. She experiences pooling of blood during  bowel movements. A previous colonoscopy last year confirmed these conditions, but surgery was not recommended due to her young age and the potential for a difficult recovery. She plans to seek a third opinion from [facility] regarding surgical intervention. Despite her significant blood loss, she is not anemic according to her last blood panel.    MEDICATIONS  Current: semaglutide, Vraylar, hydroxyzine, trazodone      Review of Systems:   Review of Systems   Constitutional:  Negative for appetite change, fatigue and unexpected weight change.   Eyes:  Negative for visual disturbance.   Respiratory:  Negative for chest tightness and shortness of breath.    Cardiovascular:  Negative for chest pain.   Musculoskeletal:  Negative for gait problem.   Skin:  Negative for rash and wound.   Neurological:  Negative for dizziness, tremors, seizures, weakness, light-headedness and headaches.   Psychiatric/Behavioral:  Negative for agitation, behavioral problems, confusion, decreased concentration, dysphoric mood, hallucinations, self-injury, sleep disturbance and suicidal ideas. The patient is not nervous/anxious and is not hyperactive.    Sleep pattern: 6 hours per night   Appetite: decreased, on semaglutide has lost 21 lbs     RISK ASSESSMENT:  Patient denies any thoughts of suicide or intent today. Patient denies any suicidal or homicidal ideation today. Patient denies any high risk factors today.     Medications:     Current Outpatient Medications:     Cariprazine HCl (VRAYLAR) 1.5 MG capsule capsule, Take 1 tablet by mouth every other day, Disp: 45 capsule, Rfl: 1    hydrOXYzine (ATARAX) 25 MG tablet, Take 1 tablet by mouth At Night As Needed for Anxiety., Disp: 30 tablet, Rfl: 2    traZODone (DESYREL) 50 MG tablet, Take 1 tablet by mouth At Night As Needed for Sleep., Disp: 30 tablet, Rfl: 1    albuterol sulfate  (90 Base) MCG/ACT inhaler, Inhale 2 puffs Every 6 (Six) Hours As Needed for Shortness of Air or Wheezing., Disp:  18 g, Rfl: 2    Hydrocortisone, Perianal, (ANUSOL-HC) 2.5 % rectal cream, APPLY 1 APPLICATION RECTAL ROUTE 2 TO 3 TIMES PER DAY AS NEEDED FOR HEMORRHOIDS, Disp: , Rfl:     naproxen (NAPROSYN) 500 MG tablet, Please see attached for detailed directions, Disp: , Rfl:     polyethylene glycol (MIRALAX) 17 GM/SCOOP powder, Take 17 g by mouth Daily., Disp: , Rfl:     Medication Considerations:  BRENDAN reviewed and appropriate.      Allergies:   Allergies   Allergen Reactions    Cinnamon Rash    Citrus Rash       Objective     Physical Exam:  Vital Signs: There were no vitals filed for this visit.  There is no height or weight on file to calculate BMI.   The patient was seen remotely today via a MyChart Video Visit through [facility].  Unable to obtain vital signs due to nature of remote visit.  Height stated at 64.02 inches.  Weight stated at 206 pounds.     Mental Status Exam:   MENTAL STATUS EXAM   General Appearance:  Cleanly groomed and dressed and well developed  Eye Contact:  Good eye contact  Attitude:  Cooperative and polite  Motor Activity:  Other  Other Comment:  SINAN video visit  Muscle Strength:  Other  Other Comment:  SINAN video visit  Speech:  Normal rate, tone, volume  Language:  Spontaneous  Mood and affect:  Normal, pleasant  Hopelessness:  Denies  Loneliness: 3  Thought Process:  Logical  Associations/ Thought Content:  No delusions  Hallucinations:  None  Suicidal Ideations:  Not present  Homicidal Ideation:  Not present  Sensorium:  Alert and clear  Orientation:  Person, place, time and situation  Immediate Recall, Recent, and Remote Memory:  Intact  Attention Span/ Concentration:  Good  Fund of Knowledge:  Appropriate for age and educational level  Intellectual Functioning:  Average range  Insight:  Good  Judgement:  Good  Reliability:  Good  Impulse Control:  Fair       @RESULASTCBCDIFFPANEL,TSH,LABLIPI,NEUGFQEN75,TBWCHCXT07,MG,FOLATE,PROLACTIN,CRPRESULT,CMP,X4PZBSVOVHG)@    Lab Results   Component Value Date     GLUCOSE 92 09/04/2024    BUN 8 09/04/2024    CREATININE 0.75 09/04/2024    EGFR 110.7 09/04/2024    BCR 10.7 09/04/2024    K 4.3 09/04/2024    CO2 23.0 09/04/2024    CALCIUM 9.1 09/04/2024    ALBUMIN 4.4 09/04/2024    BILITOT 0.2 09/04/2024    AST 20 09/04/2024    ALT 14 09/04/2024       Lab Results   Component Value Date    WBC 8.06 09/04/2024    HGB 13.8 09/04/2024    HCT 41.2 09/04/2024    MCV 87.7 09/04/2024     09/04/2024       Lab Results   Component Value Date    CHOL 143 09/04/2024    TRIG 64 09/04/2024    HDL 42 09/04/2024    LDL 88 09/04/2024       Assessment / Plan      Visit Diagnosis/Orders Placed This Visit:  Diagnoses and all orders for this visit:    1. Bipolar disorder, current episode mixed, moderate (Primary)    2. Generalized anxiety disorder    Other orders  -     Cariprazine HCl (VRAYLAR) 1.5 MG capsule capsule; Take 1 tablet by mouth every other day  Dispense: 45 capsule; Refill: 1  -     hydrOXYzine (ATARAX) 25 MG tablet; Take 1 tablet by mouth At Night As Needed for Anxiety.  Dispense: 30 tablet; Refill: 2  -     traZODone (DESYREL) 50 MG tablet; Take 1 tablet by mouth At Night As Needed for Sleep.  Dispense: 30 tablet; Refill: 1         Assessment & Plan  1. Anxiety.  Her anxiety levels have been fairly good, though they occasionally spike to a 12/10 during significant stressors, particularly related to work and family. She is working on establishing a more consistent nighttime routine to help manage her symptoms. She is advised to continue her current medication regimen and to maintain her nighttime routine. If her hand cramping worsens or becomes more frequent, she should inform the provider immediately.    2. Depression.  She reports no recent feelings of depression, suicidal ideation, self-harm, or hallucinations. She feels that her medications are working well. She is advised to continue her current medication regimen and to monitor for any changes in mood or new  symptoms.    3. Weight management.  She has lost 21 pounds since starting semaglutide, which suppresses her appetite. She feels better overall but is still learning how to eat properly. She is advised to continue with semaglutide and to be mindful of her eating habits to ensure she is consuming enough nutrients.    4. Gastrointestinal issues.  She has a history of anal fissures and hemorrhoids, confirmed by two previous colonoscopies. She experiences pooling of blood during bowel movements and is considering surgery despite the potential for a traumatic recovery. She is scheduled for a third opinion with  on 08/06/2025. She is advised to keep the provider updated on her condition and any decisions regarding surgery.      Functional Status: Mild impairment     Prognosis: Good with Ongoing Treatment     Impression/Formulation:  Patient appeared alert and oriented.  Patient is voluntarily requesting to continue outpatient psychiatric treatment at [facility].  Patient is receptive to assistance with maintaining a stable lifestyle.  Patient presents with history of     ICD-10-CM ICD-9-CM   1. Bipolar disorder, current episode mixed, moderate  F31.62 296.62   2. Generalized anxiety disorder  F41.1 300.02   . Reviewed patient's previous provider notes. Reviewed most recent labs. Patient meets DSM V diagnostic criteria for diagnoses. Diagnoses may be updated as more information becomes available.     Treatment Plan:   Continue Vraylar 1.5mg PO every other day, trazodone 25-50mg PO PRN at night and hydroxyzine 25mg PO PRN. Refilled medications  Patient was informed Provider's last day at [facility] is August 14. Patient verbalized understanding.   Encouraged to continue psychotherapy  Follow up in 3 months and as needed    Patient will continue supportive psychotherapy efforts and medications as indicated. Clinic will obtain release of information for current treatment team  for continuity of care as needed. Patient will contact this office, call 911 or present to the nearest emergency room should suicidal or homicidal ideations occur. Discussed medication options and treatment plan of prescribed medication(s) as well as the risks, benefits, and potential side effects. Patient ackowledged and verbally consented to continue with current treatment plan and was educated on the importance of compliance with treatment and follow-up appointments.     All risks/benefits and side effects discussed with patient, including risk for weight gain, increased lipids, hyperprolactemia, EPS symptoms, including restlessness, muscle  stiffness or contraction of head, face, neck, trunk and limbs, and TD (which can be irreversible). Pt verbalizes understanding and consents to treatment with these medications.    Medication risks and side effects discussed with patient including risk for worsening mood, changes in behavior, thoughts of suicide or homicide, induction of macrina, serotonin syndrome, rare hyponatremia, rare SIADH, withdrawal symptoms if abrupt withdrawal, sexual dysfunction.   If any thoughts of SI or HI, worsening mood or changes in behavior, call 911 or crisis line [phone], or go to nearest ER at once. Patient agrees to notify close family/friend of new trial of antidepressants/info above. Pt.verbalizes understanding and consents to treatment with this medication.        Quality Measures:   Former smoker    I advised Tonya Kitchen of the risks of tobacco use.       Follow Up:   Return in about 3 months (around 10/24/2025).    Patient or patient representative verbalized consent for the use of Ambient Listening during the visit with  ENRIQUE Bauman for chart documentation. 7/24/2025  08:56 EDT    ENRIQUE Bauman, [facility] [address]

## 2025-07-30 ENCOUNTER — TELEPHONE (OUTPATIENT)
Age: 30
End: 2025-07-30
Payer: COMMERCIAL

## 2025-07-30 ENCOUNTER — TELEPHONE (OUTPATIENT)
Dept: INTERNAL MEDICINE | Facility: CLINIC | Age: 30
End: 2025-07-30
Payer: COMMERCIAL

## 2025-07-30 RX ORDER — ARIPIPRAZOLE 5 MG/1
5 TABLET ORAL DAILY
Qty: 30 TABLET | Refills: 1 | Status: SHIPPED | OUTPATIENT
Start: 2025-07-30

## 2025-07-30 NOTE — TELEPHONE ENCOUNTER
Patient reports experiencing more frequent side effects from Vraylar 1.5mg PO every other day.  She reports difficulty having her hand drawing up more frequently.  She feels like she has been in a state of macrina for the past 3 to 4 weeks's demonstrated by no impulse control and feelings of carelessness.  She reports feeling paranoid about the muscle movement.  She reports only sleeping for 6 hours max at a time.    Vraylar will be discontinued. Abilify 5mg PO qd will be prescribed.  Patient was educated about medication and potential side effects.  Patient verbalized understanding.  Patient will update provider in a few weeks based on management of symptoms.

## 2025-07-30 NOTE — TELEPHONE ENCOUNTER
Pt would like for Raisa to call her in regards to having more frequent side effects from Vraylar.